# Patient Record
Sex: FEMALE | Race: WHITE
[De-identification: names, ages, dates, MRNs, and addresses within clinical notes are randomized per-mention and may not be internally consistent; named-entity substitution may affect disease eponyms.]

---

## 2017-06-05 ENCOUNTER — HOSPITAL ENCOUNTER (EMERGENCY)
Dept: HOSPITAL 68 - ERH | Age: 52
End: 2017-06-05
Payer: COMMERCIAL

## 2017-06-05 VITALS — DIASTOLIC BLOOD PRESSURE: 97 MMHG | SYSTOLIC BLOOD PRESSURE: 160 MMHG

## 2017-06-05 VITALS — WEIGHT: 167 LBS | BODY MASS INDEX: 29.59 KG/M2 | HEIGHT: 63 IN

## 2017-06-05 DIAGNOSIS — Z72.0: ICD-10-CM

## 2017-06-05 DIAGNOSIS — J40: Primary | ICD-10-CM

## 2017-06-05 NOTE — ED GENERAL ADULT
History of Present Illness
 
General
Chief Complaint: Chest Pain
Stated Complaint: "NOT FEELING WELL FOR A MONTH", CP
Source: patient
Exam Limitations: no limitations
 
Vital Signs & Intake/Output
Vital Signs & Intake/Output
 Vital Signs
 
 
Date Time Temp Pulse Resp B/P B/P Pulse O2 O2 Flow FiO2
 
     Mean Ox Delivery Rate 
 
06/05 1943      97   
 
06/05 1943      96 Room Air  
 
06/05 1819 100.0        
 
06/05 1813 100.0 101 20 160/97  98 Room Air  
 
 
 ED Intake and Output
 
 
 06/06 0000 06/05 1200
 
Intake Total  
 
Output Total  
 
Balance  
 
   
 
Patient 167 lb 
 
Weight  
 
Weight Reported by Patient 
 
Measurement  
 
Method  
 
 
Allergies
Coded Allergies:
NO KNOWN ALLERGIES (06/05/17)
 
Triage Note:
50 YO FEMALE TO ER C/O SINUS PRESSURE, SOB, FEVERS
AND DRY COUGH. STATES SAW HER DR AND WAS PUT ON
AMOXICILLIAN 10 DAYS AGO BUT DOESNT FEEL ANY
BETTER. C/O L SIDED CHEST PAIN.
PT TAKEN TO [facility] FOR EKG. TEMP 100.0 AT THIS
TIME. RA SATS 98
Triage Nurses Notes Reviewed? yes
HPI:
51-year-old female with a history of asthma, hypertension, cigarette smoking 
presenting with fevers with tmax to 100.4 Fahrenheit, left-sided chest tightness
, cough productive of yellow/green sputum, shortness of breath 1 month.  Also 
endorses sinus pressure with thick green rhinorrhea.  Was seen by PMD and placed
on 10 day course of amoxicillin which she has since finished with no 
improvement.  Currently smokes half pack of cigarettes per day.  Uses Symbicort 
daily for her asthma, has albuterol at home but has not tried it.
(VIRY VILLA,EVETTE)
Reconcile Medications
Albuterol Sulfate (Proair Hfa) 90 MCG HFA.AER.AD   2 PUF INH PRN RESPIRATORY  (
Reported)
Budesonide/Formoterol Fumarate (Symbicort 160-4.5 Mcg Inhaler) 160 MCG-4.5 MCG/
ACTUATION HFA.AER.AD   2 PUF INH BID RESPIRATORY  (Reported)
Buspirone HCl 30 MG TABLET   1 TAB PO QPM MENTAL HEALTH  (Reported)
Buspirone HCl 10 MG TABLET   1-2 TAB PO DAILY MENTAL HEALTH  (Reported)
Conjugated Estrogen Medroxypr (Prempro 0.45-1.5 MG Tablet) (Unknown Strength) 
TABLET   (Unknown Dose) UNKNOWN  (Reported)
Escitalopram Oxalate (Unknown Strength) TABLET   (Unknown Dose) UNKNOWN  (
Reported)
Lisinopril 40 MG TABLET   1 TAB PO DAILY BP  (Reported)
Moxifloxacin HCl (Avelox) 400 MG TABLET   1 TAB PO DAILY bronchitis
Prednisone 10 MG TABLET   6 TAB PO DAILY sob
Quetiapine Fumarate (Seroquel XR) 150 MG TAB.ER.24H   1 TAB PO QPM SLEEP  (
Reported)
 
(VERN GOULD,SAIDA CRUZ)
 
Past History
 
Travel History
Traveled to [place] past 21 day No
 
Medical History
Any Pertinent Medical History? see below for history
Neurological: NONE
EENT: NONE
Cardiovascular: hypertension
Respiratory: asthma
Gastrointestinal: NONE
Hepatic: NONE
Renal: NONE
Musculoskeletal: NONE
Psychiatric: NONE
Endocrine: NONE
Blood Disorders: NONE
Cancer(s): NONE
GYN/Reproductive: NONE
 
Surgical History
Surgical History: non-contributory
 
Psychosocial History
What is your primary language English
Tobacco Use: Current Daily Use
Daily Tobacco Use Amount/Type: => 5 Cigarettes daily
 
Family History
Hx Contributory? No
(EVETTE BAIRES PA-C)
 
Review of Systems
 
Review of Systems
Constitutional:
Reports: chills, fever, malaise, weakness.  Denies: diaphoresis. 
Respiratory:
Reports: cough, short of breath, sputum production, wheezing.  Denies: 
hemoptysis, orthopnea. 
Cardiovascular:
Reports: chest pain.  Denies: edema, palpitations, syncope. 
GI:
Reports: no symptoms. 
Genitourinary:
Reports: no symptoms. 
Musculoskeletal:
Reports: no symptoms. 
Skin:
Reports: no symptoms. 
Neurological/Psychological:
Reports: no symptoms. 
(EVETTE BAIRES PA-C)
 
Physical Exam
 
Physical Exam
General Appearance: well developed/nourished, no apparent distress, comfortable
Head: atraumatic
Respiratory: chest non-tender, no respiratory distress, on lung exam there are 
end expirtory wheezes bilaterally throughout all lung fields, no rhonci or rales
Cardiovascular: regular rate/rhythm, normal peripheral pulses
Skin: intact, normal color, warm/dry
 
Core Measures
ACS in differential dx? No
CVA/TIA Diagnosis: No
Severe Sepsis Present: No
Septic Shock Present: No
(EVETTE BAIRES PA-C)
 
Progress
Differential Diagnoses
I considered the following diagnoses in my evaluation of the patient: [Asthma 
exacerbation versus COPD exacerbation versus viral bronchitis versus pneumonia 
versus pleural effusion versus pleurisy]
 
Plan of Care:
 Orders
 
 
Procedure Date/time Status
 
EKG 06/05 1808 Active
 
 
Chest x-ray unremarkable.  Patient with significant improvement in chest 
tightness and shortness of breath after albuterol nebs and prednisone.  Given 
that patient continues to bring up thick green sputum and is a smoker Will cover
with a quinolone.  Patient given Rx for Moxi and prednisone.
(EVETTE BAIRES PA-C)
Initial ED EKG: normal axis, rhythm (sinus), rate (101), no ST T wave changes, 
One PVC noted in avf
(EVETTE BAIRES PA-C)
 
Departure
 
Departure
Disposition: HOME OR SELF CARE
Condition: Stable
Clinical Impression
Primary Impression: Bronchitis
Referrals:
LUPIS GOULD,JANA EUCEDA (PCP/Family)
 
Additional Instructions:
Take 400 mg of moxifloxacin by mouth once daily for 5 days.  Take 60 mg 
prednisone by mouth once daily for 5 days.  Use your albuterol inhaler every 4-6
hours as needed for shortness of breath and chest tightness.  Follow-up as 
scheduled with her primary care provider tomorrow.  Return to the ED for any new
or worsening symptoms.  Try to eliminate cigarette smoking as much as possible.
Departure Forms:
Customer Survey
General Discharge Information
(EVETTE BAIRES PA-C)
 
Departure
Prescriptions:
Current Visit Scripts
Prednisone 6 TAB PO DAILY  
     5 Days 
 
Moxifloxacin HCl (Avelox) 1 TAB PO DAILY  
     5 Days 
 
 
 
PA/NP Co-Sign Statement
Statement:
ED Attending supervision documentation-
 
[] I saw and evaluated the patient. I have also reviewed all the pertinent lab 
results and diagnostic results. I agree with the findings and the plan of care 
as documented in the PA's/NP's documentation. 
 
[X] I have reviewed the ED Record and agree with the PA's/NP's documentation.
 
[] Additions or exceptions (if any) to the PAs/NP's note and plan are 
summarized below:
[]
 
(VERN GOULD,SAIDA CRUZ)
 
Critical Care Note
 
Critical Care Note
Critical Care Time: non-applicable
(EVETTE BAIRES PA-C)

## 2017-06-05 NOTE — RADIOLOGY REPORT
EXAMINATION:
XR CHEST
 
CLINICAL INFORMATION:
Cough, fever, pneumonia
 
COMPARISON:
10/29/2015
 
TECHNIQUE:
2 views of the chest were obtained.
 
FINDINGS:
Lungs are clear. No focal consolidation or mass. Normal pulmonary
vascularity.  No pleural effusion or pneumothorax. Normal heart size.
Regional skeleton intact.
 
IMPRESSION:
No acute pulmonary disease. No focal consolidation to suggest pneumonia.

## 2018-01-08 ENCOUNTER — HOSPITAL ENCOUNTER (EMERGENCY)
Dept: HOSPITAL 68 - ERH | Age: 53
Discharge: STILL ADMITTED OR ADMINISTRATIVE | End: 2018-01-08
Payer: COMMERCIAL

## 2018-01-08 DIAGNOSIS — R68.89: Primary | ICD-10-CM

## 2018-09-16 ENCOUNTER — HOSPITAL ENCOUNTER (EMERGENCY)
Dept: HOSPITAL 68 - ERH | Age: 53
LOS: 1 days | End: 2018-09-17
Payer: COMMERCIAL

## 2018-09-16 VITALS — BODY MASS INDEX: 31.89 KG/M2 | WEIGHT: 180 LBS | HEIGHT: 63 IN

## 2018-09-16 DIAGNOSIS — F10.129: ICD-10-CM

## 2018-09-16 DIAGNOSIS — F17.210: ICD-10-CM

## 2018-09-16 DIAGNOSIS — R45.851: Primary | ICD-10-CM

## 2018-09-16 DIAGNOSIS — J45.909: ICD-10-CM

## 2018-09-16 DIAGNOSIS — I10: ICD-10-CM

## 2018-09-16 LAB
ABSOLUTE GRANULOCYTE CT: 6.2 /CUMM (ref 1.4–6.5)
BASOPHILS # BLD: 0 /CUMM (ref 0–0.2)
BASOPHILS NFR BLD: 0.3 % (ref 0–2)
EOSINOPHIL # BLD: 0.1 /CUMM (ref 0–0.7)
EOSINOPHIL NFR BLD: 1 % (ref 0–5)
ERYTHROCYTE [DISTWIDTH] IN BLOOD BY AUTOMATED COUNT: 14.8 % (ref 11.5–14.5)
GRANULOCYTES NFR BLD: 70 % (ref 42.2–75.2)
HCT VFR BLD CALC: 41.5 % (ref 37–47)
LYMPHOCYTES # BLD: 1.8 /CUMM (ref 1.2–3.4)
MCH RBC QN AUTO: 32.2 PG (ref 27–31)
MCHC RBC AUTO-ENTMCNC: 33.6 G/DL (ref 33–37)
MCV RBC AUTO: 95.7 FL (ref 81–99)
MONOCYTES # BLD: 0.7 /CUMM (ref 0.1–0.6)
PLATELET # BLD: 298 /CUMM (ref 130–400)
PMV BLD AUTO: 7.4 FL (ref 7.4–10.4)
RED BLOOD CELL CT: 4.34 /CUMM (ref 4.2–5.4)
WBC # BLD AUTO: 8.9 /CUMM (ref 4.8–10.8)

## 2018-09-16 PROCEDURE — G0480 DRUG TEST DEF 1-7 CLASSES: HCPCS

## 2018-09-16 PROCEDURE — G0463 HOSPITAL OUTPT CLINIC VISIT: HCPCS

## 2018-09-17 VITALS — SYSTOLIC BLOOD PRESSURE: 147 MMHG | DIASTOLIC BLOOD PRESSURE: 72 MMHG
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
## 2018-09-17 NOTE — RADIOLOGY REPORT
EXAMINATION:
XR CHEST
 
CLINICAL INFORMATION:
Cough, low-grade fever
 
COMPARISON:
04/27/2018
 
TECHNIQUE:
2 views of the chest were obtained.
 
FINDINGS:
The lungs are clear with no focal consolidation. No evidence of pneumothorax,
pulmonary edema, or pleural effusions.
 
The cardiomediastinal silhouette is unremarkable. No acute osseous findings.
 
IMPRESSION:
No acute cardiopulmonary findings.

## 2018-09-19 ENCOUNTER — HOSPITAL ENCOUNTER (INPATIENT)
Dept: HOSPITAL 68 - ERH | Age: 53
LOS: 6 days | End: 2018-09-25
Attending: INTERNAL MEDICINE | Admitting: INTERNAL MEDICINE
Payer: COMMERCIAL

## 2018-09-19 VITALS — DIASTOLIC BLOOD PRESSURE: 100 MMHG | SYSTOLIC BLOOD PRESSURE: 135 MMHG

## 2018-09-19 VITALS — DIASTOLIC BLOOD PRESSURE: 101 MMHG | SYSTOLIC BLOOD PRESSURE: 193 MMHG

## 2018-09-19 VITALS — BODY MASS INDEX: 30.69 KG/M2 | HEIGHT: 63 IN | WEIGHT: 173.19 LBS

## 2018-09-19 VITALS — SYSTOLIC BLOOD PRESSURE: 169 MMHG | DIASTOLIC BLOOD PRESSURE: 91 MMHG

## 2018-09-19 VITALS — SYSTOLIC BLOOD PRESSURE: 140 MMHG | DIASTOLIC BLOOD PRESSURE: 98 MMHG

## 2018-09-19 VITALS — SYSTOLIC BLOOD PRESSURE: 183 MMHG | DIASTOLIC BLOOD PRESSURE: 107 MMHG

## 2018-09-19 VITALS — DIASTOLIC BLOOD PRESSURE: 109 MMHG | SYSTOLIC BLOOD PRESSURE: 154 MMHG

## 2018-09-19 DIAGNOSIS — E87.6: ICD-10-CM

## 2018-09-19 DIAGNOSIS — Z79.51: ICD-10-CM

## 2018-09-19 DIAGNOSIS — R00.0: ICD-10-CM

## 2018-09-19 DIAGNOSIS — R11.2: ICD-10-CM

## 2018-09-19 DIAGNOSIS — F10.239: Primary | ICD-10-CM

## 2018-09-19 DIAGNOSIS — F32.9: ICD-10-CM

## 2018-09-19 DIAGNOSIS — R74.0: ICD-10-CM

## 2018-09-19 DIAGNOSIS — F41.9: ICD-10-CM

## 2018-09-19 DIAGNOSIS — K70.9: ICD-10-CM

## 2018-09-19 DIAGNOSIS — J45.909: ICD-10-CM

## 2018-09-19 DIAGNOSIS — K76.0: ICD-10-CM

## 2018-09-19 DIAGNOSIS — E78.5: ICD-10-CM

## 2018-09-19 DIAGNOSIS — I10: ICD-10-CM

## 2018-09-19 LAB
ABSOLUTE GRANULOCYTE CT: 9.9 /CUMM (ref 1.4–6.5)
BASOPHILS # BLD: 0 /CUMM (ref 0–0.2)
BASOPHILS NFR BLD: 0.1 % (ref 0–2)
EOSINOPHIL # BLD: 0 /CUMM (ref 0–0.7)
EOSINOPHIL NFR BLD: 0 % (ref 0–5)
ERYTHROCYTE [DISTWIDTH] IN BLOOD BY AUTOMATED COUNT: 15.2 % (ref 11.5–14.5)
GRANULOCYTES NFR BLD: 87.1 % (ref 42.2–75.2)
HCT VFR BLD CALC: 40.2 % (ref 37–47)
LYMPHOCYTES # BLD: 0.8 /CUMM (ref 1.2–3.4)
MCH RBC QN AUTO: 32.5 PG (ref 27–31)
MCHC RBC AUTO-ENTMCNC: 34.2 G/DL (ref 33–37)
MCV RBC AUTO: 94.9 FL (ref 81–99)
MONOCYTES # BLD: 0.6 /CUMM (ref 0.1–0.6)
PLATELET # BLD: 261 /CUMM (ref 130–400)
PMV BLD AUTO: 7.8 FL (ref 7.4–10.4)
RED BLOOD CELL CT: 4.24 /CUMM (ref 4.2–5.4)
WBC # BLD AUTO: 11.4 /CUMM (ref 4.8–10.8)

## 2018-09-19 PROCEDURE — 2NBSP: CPT

## 2018-09-19 PROCEDURE — G0480 DRUG TEST DEF 1-7 CLASSES: HCPCS

## 2018-09-19 NOTE — ED GENERAL ADULT
History of Present Illness
 
General
Chief Complaint: ETOH/Drug Related Complaint
Stated Complaint: PT STATES DETOXING FROM ETOH
Source: patient
Exam Limitations: no limitations
 
Vital Signs & Intake/Output
Vital Signs & Intake/Output
 Vital Signs
 
 
Date Time Temp Pulse Resp B/P B/P Pulse O2 O2 Flow FiO2
 
     Mean Ox Delivery Rate 
 
09/19 1400 98.2 111 18 169/91     
 
09/19 1308 98.2 105 18 193/101  98   
 
09/19 1307 98.2 105 18 193/101     
 
09/19 1202 98.0 115 20 135/100     
 
09/19 1122 98.0 115 20 135/100  96 Room Air  
 
09/19 0925 98.2 131 20 161/100  99 Room Air  
 
 
 
Allergies
Coded Allergies:
NO KNOWN ALLERGIES (06/05/17)
 
Reconcile Medications
Albuterol Sulfate (Proair Hfa) 90 MCG HFA.AER.AD   2 PUF INH PRN RESPIRATORY  (
Reported)
Budesonide/Formoterol Fumarate (Symbicort 160-4.5 Mcg Inhaler) 160 MCG-4.5 MCG/
ACTUATION HFA.AER.AD   2 PUF INH BID RESPIRATORY  (Reported)
Buspirone HCl 30 MG TABLET   1 TAB PO QPM MENTAL HEALTH  (Reported)
Buspirone HCl 10 MG TABLET   1 TAB PO DAILY MENTAL HEALTH  (Reported)
Doxepin HCl 100 MG CAPSULE   2 CAP PO DAILY UNKNOWN  (Reported)
Escitalopram Oxalate 10 MG TABLET   1 TAB PO DAILY MENTAL HEALTH  (Reported)
Lisinopril 40 MG TABLET   1 TAB PO DAILY BP  (Reported)
Nifedipine (Nifedipine ER) 30 MG TABLET.ER   1 TAB PO DAILY HEART  (Reported)
Pravastatin Sodium 40 MG TABLET   1 TAB PO DAILY CHOLESTEROL  (Reported)
Quetiapine Fumarate (Seroquel XR) 150 MG TAB.ER.24H   1 TAB PO QPM SLEEP  (
Reported)
Valsartan (Diovan) 160 MG TABLET   1 TAB PO DAILY HEART  (Reported)
 
Triage Note:
BIBA FROM HOME,  C/O FEVER, [place] AND VOMITING
PALPITATIONS SINCE 1830 YESTERDAY.  STATES SHE IS
TRYING TO DETOX OFF ALCOHOL.  SEEN HERE ON 9/16
FOR INTOCXICATION.
Triage Nurses Notes Reviewed? yes
HPI:
52-year-old female with past medical history significant for alcohol dependence 
presents with nausea and vomiting since last night and shakes.  Patient states 
that she is going through alcohol detox.  She presents today requesting for help
with her withdrawals and wants help with alcohol detox.  Associated symptoms of 
shakiness, emotional lability, feeling jittery.  Drinks 3 pints of Southern 
comfort a day.  She states that she has tried to wean herself of the alcohol but
she is unable to and she is afraid if she stops cold turkey on her own that she 
will have seizures and possibly worse.
 
Past History
 
Travel History
Traveled to [place] past 21 day No
 
Medical History
Any Pertinent Medical History? see below for history
Neurological: NONE
EENT: NONE
Cardiovascular: hypertension
Respiratory: asthma
Gastrointestinal: NONE
Hepatic: NONE
Renal: NONE
Musculoskeletal: NONE
Psychiatric: NONE
Endocrine: NONE
Blood Disorders: NONE
Cancer(s): NONE
GYN/Reproductive: NONE
 
Surgical History
Surgical History: non-contributory
 
Psychosocial History
Who do you live with Significant Other
What is your primary language English
Tobacco Use: Current Daily Use
Daily Tobacco Use Amount/Type: => 5 Cigarettes daily
ETOH Use: heavy use
 
Family History
Hx Contributory? Yes
 
Review of Systems
 
Review of Systems
Constitutional:
Reports: no symptoms, see HPI. 
EENTM:
Reports: no symptoms. 
Respiratory:
Reports: no symptoms. 
Cardiovascular:
Reports: no symptoms. 
GI:
Reports: no symptoms. 
Genitourinary:
Reports: no symptoms. 
Musculoskeletal:
Reports: no symptoms. 
Skin:
Reports: no symptoms. 
Neurological/Psychological:
Reports: no symptoms. 
Hematologic/Endocrine:
Reports: no symptoms. 
Immunologic/Allergic:
Reports: no symptoms. 
All Other Systems: Reviewed and Negative
 
Physical Exam
 
Physical Exam
General Appearance: no apparent distress, anxious
Comments:
General: Alert, anxious, cooperative
Head: Normocephalic, atraumatic
Eyes: Normal inspection, no nystagmus, EOMI
Ears: Normal inspection
Nose: Normal inspection
Throat: Moist mucosa
Neck: Supple, no goiter
Heart: Regular rate and rhythm, no murmurs rubs or gallops
Lungs: Clear to auscultation bilaterally with good air entry
Abdomen: Soft nontender nondistended, normal bowel sounds
Chest: Nontender
Extremities: Normal range of motion grossly, mild tremors present, no cyanosis 
clubbing or edema of the upper extremities
Neurologic: cranial nerves II through XII grossly intact, speech clear, gait 
normal
Psychiatric: No apparent delusions or hallucinations, no pressured speech or 
thought blocking
 
Core Measures
ACS in differential dx? No
CVA/TIA Diagnosis: No
Sepsis Present: No
Sepsis Focused Exam Completed? No
 
Progress
Differential Diagnoses
I considered the following diagnoses in my evaluation of the patient: 
 
Plan of Care:
 Orders
 
 
Procedure Date/time Status
 
EKG 09/19 0929 Active
 
CIWA 09/19 0927 Active
 
URINE DRUGS OF ABUSE 09/19 0927 Complete
 
ETHANOL 09/19 0927 Complete
 
COMPREHENSIVE METABOLIC PANEL 09/19 0927 Complete
 
CBC WITHOUT DIFFERENTIAL 09/19 0927 Complete
 
 
 Current Medications
 
 
  Sig/Rafa Start time  Last
 
Medication Dose  Stop Time Status Admin
 
Cyanocobalamin/ 1 BAG ONCE ONE 09/19 1230 AC 09/19
 
Thiamine/Pyridoxine   09/19 2029  1256
 
(Vitamin in I.V.)     
 
Dextrose/Water 1,000 ML    
 
(D5W 1000)     
 
 
 Laboratory Tests
 
 
 
09/19/18 1224:
Urine Opiates Screen 134, Methadone Screen 74, Barbiturate Screen < 60, Ur 
Phencyclidine Scrn < 6.00, Amphetamines Screen < 100, U Benzodiazepines Scrn < 
85, Urine Cocaine Screen < 50, Urine Cannabis Screen 32.70
 
09/19/18 0946:
Anion Gap 11, Estimated GFR > 60, BUN/Creatinine Ratio 14.0, Glucose 142  H, 
Calcium 9.1, Total Bilirubin 0.8, AST 52  H, ALT 65  H, Alkaline Phosphatase 124
, Total Protein 7.0, Albumin 4.5, Globulin 2.5, Albumin/Globulin Ratio 1.8, CBC 
w Diff MAN DIFF ORDERED, RBC 4.24, MCV 94.9, MCH 32.5  H, MCHC 34.2, RDW 15.2  H
, MPV 7.8, Gran % 87.1  H, Lymphocytes % 7.1  L, Monocytes % 5.7, Eosinophils % 
0, Basophils % 0.1, Absolute Granulocytes 9.9  H, Segmented Neutrophils 81  H, 
Band Neutrophils 2, Absolute Lymphocytes 0.8  L, Lymphocytes 10  L, Monocytes 7,
Absolute Monocytes 0.6, Absolute Eosinophils 0, Absolute Basophils 0, Platelet 
Estimate ADEQUATE, Normocytic RBCs VERIFIED, Normochromic RBCs VERIFIED, Serum 
Alcohol < 10.0
 
Initial ED EKG: normal axis, normal intervals, normal p-waves, normal QRS 
complex, normal sinus rhythm, nonspecific ST T wave chg
 
Departure
 
Departure
Disposition: STILL A PATIENT
Condition: Stable
Clinical Impression
Primary Impression: Alcohol abuse
Secondary Impressions: 
Alcohol withdrawal
Qualifiers:  Complication of substance-induced condition: with unspecified 
complication Qualified Code: F10.239 - Alcohol dependence with withdrawal, 
unspecified
 
Referrals:
Mahesh Gonzalez MD (PCP/Family)
 
Departure Forms:
Customer Survey
General Discharge Information
Comments
Initial CIWA of 17, repeat of 5 s/p 2 mg of ativan. Ms Ferreira continues to 
have symptoms. 
 
Critical Care Note
 
Critical Care Note
Critical Care Time: non-applicable

## 2018-09-19 NOTE — HISTORY & PHYSICAL
Jose GOULD,[facility] 09/19/18 3184:
General Information and HPI
MD Statement:
I have seen and personally examined SAJAN CROSS and documented this H&P.
 
The patient is a 52 year old F who presented with a patient stated chief 
complaint of [nausea vomiting].
 
Source of Information: patient
Exam Limitations: no limitations
History of Present Illness:
The patient is 52-year-old female with past medical history significant for 
depression, asthma, hypertension and hyperlipidemia.  He presented to [facility]
with a complaint of subjective fever and nausea vomiting.  Patient reported that
she usually drinks 5 pints of hard liquor every day ongoing for past 1 year..  
Her last drink was yesterday morning around 8 AM.  Since then patient has been 
feeling nauseous and has been having multiple episodes of retching and a couple 
of episodes of vomiting, to a point that she cannot keep anything down.  Patient
denies any hematemesis.  Patient has been previously treated in a detox facility
in [place] denies any seizures alcohol withdrawal any ICU admission in the past.
The patient also reports generalized abdominal pain mostly concentrated in 
epigastric region.  Worsened by retching.  Dull in character nonradiating.
Review of systems she denies any chest pain reports mild cough which she is 
attributing to retching.
 
Allergies/Medications
Allergies:
Coded Allergies:
NO KNOWN ALLERGIES (06/05/17)
 
Home Med list
Albuterol Sulfate (Proair Hfa) 90 MCG HFA.AER.AD   2 PUF INH PRN RESPIRATORY  (
Reported)
Budesonide/Formoterol Fumarate (Symbicort 160-4.5 Mcg Inhaler) 160 MCG-4.5 MCG/
ACTUATION HFA.AER.AD   2 PUF INH BID RESPIRATORY  (Reported)
Buspirone HCl 30 MG TABLET   1 TAB PO QPM MENTAL HEALTH  (Reported)
Buspirone HCl 10 MG TABLET   1 TAB PO DAILY MENTAL HEALTH  (Reported)
Doxepin HCl 100 MG CAPSULE   2 CAP PO DAILY Mental Health  (Reported)
Escitalopram Oxalate 10 MG TABLET   1 TAB PO DAILY MENTAL HEALTH  (Reported)
Multiple Vitamin (Multivitamins) 1 EACH TABLET   1 TAB PO DAILY supplement
Nifedipine (Nifedipine ER) 30 MG TABLET.ER   1 TAB PO DAILY HEART  (Reported)
Omeprazole 40 MG CAPSULE.DR   1 CAP PO DAILY gastritis
Pravastatin Sodium 40 MG TABLET   1 TAB PO DAILY CHOLESTEROL  (Reported)
Valsartan (Diovan) 160 MG TABLET   1 TAB PO DAILY HEART  (Reported)
 
 
Past History
 
Travel History
Traveled to [place] past 21 day No
 
Medical History
Neurological: NONE
EENT: NONE
Cardiovascular: hypertension
Respiratory: asthma
Gastrointestinal: NONE
Hepatic: NONE
Renal: NONE
Musculoskeletal: NONE
Psychiatric: NONE
Endocrine: NONE
Blood Disorders: NONE
Cancer(s): NONE
GYN/Reproductive: NONE
 
Surgical History
Surgical History: non-contributory
 
Past Family/Social History
 
Family History
Relations & Conditions if any
Relation not specified for:
  *No pertinent family history
 
 
Psychosocial History
Where do you live? Home
Who Do You Live With? spouse
Smoking Status: Current Everyday Smoker
ETOH Use: heavy use
 
Functional Ability
ADLs
Independent: dressing, eating, toileting, bathing. 
Ambulation: independent
IADLs
Independent: shopping, housework, finances, food prep, telephone, transportation
, medication admin. 
 
Review of Systems
 
Review of Systems
Constitutional:
Reports: see HPI. 
 
Exam & Diagnostic Data
Last 24 Hrs of Vital Signs/I&O
 Vital Signs
 
 
Date Time Temp Pulse Resp B/P B/P Pulse O2 O2 Flow FiO2
 
     Mean Ox Delivery Rate 
 
09/19 1600 98.3 118 18 154/109     
 
09/19 1600 98.3 118 18 154/109  95   
 
09/19 1400 98.2 111 18 169/91     
 
09/19 1308 98.2 105 18 193/101  98   
 
09/19 1307 98.2 105 18 193/101     
 
09/19 1202 98.0 115 20 135/100     
 
09/19 1122 98.0 115 20 135/100  96 Room Air  
 
09/19 0925 98.2 131 20 161/100  99 Room Air  
 
 
 Intake & Output
 
 
 09/19 1600 09/19 0800 09/19 0000
 
Intake Total 1000  
 
Output Total   
 
Balance 1000  
 
    
 
Intake, IV 1000  
 
Patient 170 lb  
 
Weight   
 
Weight Reported by Patient  
 
Measurement   
 
Method   
 
 
 
 
Physical Exam
General Appearance Alert, Oriented X3, Cooperative
HEENT Atraumatic
Neck Supple
Cardiovascular Normal S1, Normal S2
Lungs Clear to Auscultation, Normal Air Movement
Abdomen Normal Bowel Sounds, Soft, epigastric mild tendrness
Neurological Normal Gait, Normal Speech
Extremities No Cyanosis, No Edema
Last 24 Hrs of Labs/Leroy:
 Laboratory Tests
 
09/19/18 1224:
Urine Opiates Screen 134, Methadone Screen 74, Barbiturate Screen < 60, Ur 
Phencyclidine Scrn < 6.00, Amphetamines Screen < 100, U Benzodiazepines Scrn < 
85, Urine Cocaine Screen < 50, Urine Cannabis Screen 32.70
 
09/19/18 0946:
Anion Gap 11, Estimated GFR > 60, BUN/Creatinine Ratio 14.0, Glucose 142  H, 
Calcium 9.1, Total Bilirubin 0.8, AST 52  H, ALT 65  H, Alkaline Phosphatase 124
, Total Protein 7.0, Albumin 4.5, Globulin 2.5, Albumin/Globulin Ratio 1.8, CBC 
w Diff MAN DIFF ORDERED, RBC 4.24, MCV 94.9, MCH 32.5  H, MCHC 34.2, RDW 15.2  H
, MPV 7.8, Gran % 87.1  H, Lymphocytes % 7.1  L, Monocytes % 5.7, Eosinophils % 
0, Basophils % 0.1, Absolute Granulocytes 9.9  H, Segmented Neutrophils 81  H, 
Band Neutrophils 2, Absolute Lymphocytes 0.8  L, Lymphocytes 10  L, Monocytes 7,
Absolute Monocytes 0.6, Absolute Eosinophils 0, Absolute Basophils 0, Platelet 
Estimate ADEQUATE, Normocytic RBCs VERIFIED, Normochromic RBCs VERIFIED, Serum 
Alcohol < 10.0
 
 
Assessment/Plan
Assessment:
The patient is 52-year-old female with above-mentioned past medical history.  
She came in evaluation of nausea vomiting and fever.  Her past medical history 
significant for alcohol abuse disorder and drinks about 4-5 points every day.  
On presentation her vitals were significant for tachycardia and blood pressure 
of 169/91.
Labs are significant for mild leukocytosis of 11.4 hypokalemia 3.1 transaminitis
AST 52 ALT 65
The patient is being admitted to general medicine floor for treatment and 
evaluation of following conditions
 
#Alcohol withdrawal
Significant history of alcohol abuse with hard liquor last drink yesterday a.m.
-[facility] protocol 
-Ativan 2 mg Q6 
-symptomatic management of nausea with Zofran/Tigan
-Multivitamin folate and thiamine
 
#Transaminitis
In setting of alcohol abuse medically discrimination shows good prognosis
Continue to monitor
If trending upward we will consider right upper quadrant ultrasound
 
#Hypokalemia
 in setting of alcohol abuse and poor oral intake and ongoing nausea and 
vomiting
-Continue to monitor and replete aggressively
 
#Abdominal pain/N/V
Appears to be secondary to retching versus alcoholic gastritis.  
-We will start patient on Protonix
We will check lipase to rule out pancreatitis
-IV hydration with ongoing nausea vomiting
 
Full code/DVT prophylaxis with Lovenox/clears for now and advance as tolerated
 
As Ranked By This Provider
Problem List:
 1. Alcohol withdrawal
   Qualifiers
 Complication of substance-induced condition: with unspecified complication 
Qualified Code: F10.239 - Alcohol dependence with withdrawal, unspecified
 
 
Core Measures/Misc (9/17)
 
Acute Coronary Syndrome
ACS Diagnosis: No
 
Congestive Heart Failure
Congestive Heart Failure Diagnosis No
 
Cerebrovascular Accident
CVA/TIA Diagnosis: No
 
VTE (View Protocol)
VTE Risk Factors Age>40
No Mechanical VTE Prophylaxis d/t N/A MechProphylax Ordered
No VTE Pharm Prophylaxis d/t NA PharmProphylax ordered
 
Sepsis (View protocol)
Sepsis Present: No
If YES complete Sepsis Event Note If YES complete Sepsis Event Note
 
 
Shelly Servin MD 09/19/18 1757:
Core Measures/Misc (9/17)
 
Sepsis (View protocol)
If YES complete Sepsis Event Note If YES complete Sepsis Event Note
 
Attending MD Review Statement
 
Attending Statement
Attending MD Statement: examined this patient, discuss w/resident/PA/NP, agreed 
w/resident/PA/NP, reviewed EMR data (avail), discussed with nursing, amended to 
note
Attending Assessment/Plan:
52-year-old female with past medical history significant for alcohol use, asthma
,Hypertension, depression who is resenting to get alcohol detox patient has been
drinking hard liquor.  Almost 4-5 pints every day.  She has been doing this for 
almost a year now.  She states that she had been to a facility in [place] in the
past.  She came back and got depressed and started to drink.  She is also 
complaining of generalized abdominal pain mostly in the epigastric region.  She 
was vomiting all night.  Denies any bright red blood per rectum.  Denies any 
hematemesis.  She also had some diarrhea.  She is here because she cannot take 
it anymore.  In the emergency room patient received Ativan.
 
Vital Signs
 
 
Date Time Temp Pulse Resp B/P B/P Pulse O2 O2 Flow FiO2
 
     Mean Ox Delivery Rate 
 
09/19 1600 98.3 118 18 154/109     
 
09/19 1600 98.3 118 18 154/109  95   
 
09/19 1400 98.2 111 18 169/91     
 
09/19 1308 98.2 105 18 193/101  98   
 
09/19 1307 98.2 105 18 193/101     
 
09/19 1202 98.0 115 20 135/100     
 
09/19 1122 98.0 115 20 135/100  96 Room Air  
 
09/19 0925 98.2 131 20 161/100  99 Room Air  
 
 
on exam; 
aox3, mild distress 2/2 to withdrawl.
heent: dry MM. 
Cv; s1,s2, rrr, tachy
resp; clear
abd; soft, tender in epigastrium, bs+
ext; no edema
 
 Laboratory Tests
 
 
 09/19 09/19
 
 1224 0946
 
Chemistry  
 
  Sodium (137 - 145 mmol/L)  139
 
  Potassium (3.5 - 5.1 mmol/L)  3.1  L
 
  Chloride (98 - 107 mmol/L)  100
 
  Carbon Dioxide (22 - 30 mmol/L)  28
 
  Anion Gap (5 - 16)  11
 
  BUN (7 - 17 mg/dL)  7
 
  Creatinine (0.5 - 1.0 mg/dL)  0.5
 
  Estimated GFR (>60 ml/min)  > 60
 
  BUN/Creatinine Ratio (7 - 25 %)  14.0
 
  Glucose (65 - 99 mg/dL)  142  H
 
  Calcium (8.4 - 10.2 mg/dL)  9.1
 
  Total Bilirubin (0.2 - 1.3 mg/dL)  0.8
 
  AST (14 - 36 U/L)  52  H
 
  ALT (9 - 52 U/L)  65  H
 
  Alkaline Phosphatase (<127 U/L)  124
 
  Total Protein (6.3 - 8.2 g/dL)  7.0
 
  Albumin (3.5 - 5.0 g/dL)  4.5
 
  Globulin (1.9 - 4.2 gm/dL)  2.5
 
  Albumin/Globulin Ratio (1.1 - 2.2 %)  1.8
 
Hematology  
 
  CBC w Diff  MAN DIFF ORDERED
 
  WBC (4.8 - 10.8 /CUMM)  11.4  H
 
  RBC (4.20 - 5.40 /CUMM)  4.24
 
  Hgb (12.0 - 16.0 G/DL)  13.8
 
  Hct (37 - 47 %)  40.2
 
  MCV (81.0 - 99.0 FL)  94.9
 
  MCH (27.0 - 31.0 PG)  32.5  H
 
  MCHC (33.0 - 37.0 G/DL)  34.2
 
  RDW (11.5 - 14.5 %)  15.2  H
 
  Plt Count (130 - 400 /CUMM)  261
 
  MPV (7.4 - 10.4 FL)  7.8
 
  Gran % (42.2 - 75.2 %)  87.1  H
 
  Lymphocytes % (20.5 - 51.1 %)  7.1  L
 
  Monocytes % (1.7 - 9.3 %)  5.7
 
  Eosinophils % (0 - 5 %)  0
 
  Basophils % (0.0 - 2.0 %)  0.1
 
  Absolute Granulocytes (1.4 - 6.5 /CUMM)  9.9  H
 
  Segmented Neutrophils (42.2 - 75.2 %)  81  H
 
  Band Neutrophils (0.0 - 5.0 %)  2
 
  Absolute Lymphocytes (1.2 - 3.4 /CUMM)  0.8  L
 
  Lymphocytes (20.5 - 51.1 %)  10  L
 
  Monocytes (1.7 - 9.3 %)  7
 
  Absolute Monocytes (0.10 - 0.60 /CUMM)  0.6
 
  Absolute Eosinophils (0.0 - 0.7 /CUMM)  0
 
  Absolute Basophils (0.0 - 0.2 /CUMM)  0
 
  Platelet Estimate (ADEQUATE)  ADEQUATE
 
  Normocytic RBCs  VERIFIED
 
  Normochromic RBCs  VERIFIED
 
Toxicology  
 
  Urine Opiates Screen (>2000 NG/ML) 134 
 
  Methadone Screen (>300 NG/ML) 74 
 
  Barbiturate Screen (>200 NG/ML) < 60 
 
  Ur Phencyclidine Scrn (>25 NG/ML) < 6.00 
 
  Amphetamines Screen (>1000 NG/ML) < 100 
 
  U Benzodiazepines Scrn (>200 NG/ML) < 85 
 
  Urine Cocaine Screen (>300 NG/ML) < 50 
 
  Urine Cannabis Screen (>50 NG/ML) 32.70 
 
  Serum Alcohol (<10 MG/DL)  < 10.0
 
 
A/P: 52-year-old female with past medical history significant for alcohol use, 
asthma,Hypertension, depression will be admitted to medicine floor with acute 
alcohol intoxication needing detox as well as abd pain.
 
Patient will be started on scheduled and as needed Ativan per [facility] protocol.  
She will be given multivitamin, folate and thiamine.  Please repeat all of her 
electrolytes as indicated.  Please confirm and continue home medications. 
Hydrate with IVfs as she looks Dehydrated.  Symptomatic control of nausea 
vomiting with antiemetics.  Her abdominal pain is likely secondary to alcoholic 
gastritis.  Please add amylase lipase to look for any evidence of acute 
pancreatitis. Please give IV PPI. 
She will be on pharmacologic DVT prophylaxis and she is a full code.
Please obtain social work consult.

## 2018-09-20 VITALS — SYSTOLIC BLOOD PRESSURE: 122 MMHG | DIASTOLIC BLOOD PRESSURE: 84 MMHG

## 2018-09-20 VITALS — SYSTOLIC BLOOD PRESSURE: 182 MMHG | DIASTOLIC BLOOD PRESSURE: 124 MMHG

## 2018-09-20 VITALS — DIASTOLIC BLOOD PRESSURE: 81 MMHG | SYSTOLIC BLOOD PRESSURE: 114 MMHG

## 2018-09-20 VITALS — DIASTOLIC BLOOD PRESSURE: 130 MMHG | SYSTOLIC BLOOD PRESSURE: 172 MMHG

## 2018-09-20 VITALS — DIASTOLIC BLOOD PRESSURE: 96 MMHG | SYSTOLIC BLOOD PRESSURE: 138 MMHG

## 2018-09-20 VITALS — DIASTOLIC BLOOD PRESSURE: 124 MMHG | SYSTOLIC BLOOD PRESSURE: 182 MMHG

## 2018-09-20 LAB
ABSOLUTE GRANULOCYTE CT: 5 /CUMM (ref 1.4–6.5)
BASOPHILS # BLD: 0 /CUMM (ref 0–0.2)
BASOPHILS NFR BLD: 0.3 % (ref 0–2)
EOSINOPHIL # BLD: 0 /CUMM (ref 0–0.7)
EOSINOPHIL NFR BLD: 0.6 % (ref 0–5)
ERYTHROCYTE [DISTWIDTH] IN BLOOD BY AUTOMATED COUNT: 15.5 % (ref 11.5–14.5)
GRANULOCYTES NFR BLD: 74.6 % (ref 42.2–75.2)
HCT VFR BLD CALC: 39.1 % (ref 37–47)
LYMPHOCYTES # BLD: 1.1 /CUMM (ref 1.2–3.4)
MCH RBC QN AUTO: 32.6 PG (ref 27–31)
MCHC RBC AUTO-ENTMCNC: 34.3 G/DL (ref 33–37)
MCV RBC AUTO: 95.1 FL (ref 81–99)
MONOCYTES # BLD: 0.5 /CUMM (ref 0.1–0.6)
PLATELET # BLD: 202 /CUMM (ref 130–400)
PMV BLD AUTO: 8.3 FL (ref 7.4–10.4)
RED BLOOD CELL CT: 4.11 /CUMM (ref 4.2–5.4)
WBC # BLD AUTO: 6.7 /CUMM (ref 4.8–10.8)

## 2018-09-20 NOTE — PN- HOUSESTAFF
Regino Arango 18 0938:
Subjective
Follow-up For:
alcohol withdrwal
Subjective:
Patient was seen and examined today. she said she feels better, but has some 
vague abdominal soreness, cold sweats, nausea, tremors, . Her CIWA has been 0  
and has recieved Ativan 2mg only,. 
She also c/o muscle soreness at the siteof anti emetic injection.and requested 
to change them
 
Review of Systems
Constitutional:
Reports: see HPI. 
 
Objective
Last 24 Hrs of Vital Signs/I&O
 Vital Signs
 
 
Date Time Temp Pulse Resp B/P B/P Pulse O2 O2 Flow FiO2
 
     Mean Ox Delivery Rate 
 
 1800 98.1 133 16 114/81     
 
 1741 99.1 130 20 114/81  96 Room Air  
 
 1620  119  182/124     
 
 1600 98.7 134 16 182/124     
 
 1417 99.0 130 20 172/130  95 Room Air  
 
 1233 98.9 119 18 182/124  98 Room Air  
 
 1224       Room Air Room Air 
 
 1200 97.5 119 16 182/124     
 
 1000 97.1 95 16 122/84     
 
 0659 98.4 110 20 138/96  96   
 
 2219 98.6 111 20 140/98  94 Room Air  
 
 2200 98.6 111 20 140/98     
 
 
 Intake & Output
 
 
  1600  0800  0000
 
Intake Total  420 365
 
Output Total   
 
Balance  420 365
 
    
 
Intake, IV  300 125
 
Intake, Oral  120 240
 
Patient   173 lb
 
Weight   
 
Weight   Bed scale
 
Measurement   
 
Method   
 
 
 
 
Physical Exam
General Appearance: Alert, Oriented X3, Cooperative, No Acute Distress
Cardiovascular: Regular Rate, No Murmurs
Lungs: Clear to Auscultation, Normal Air Movement
Abdomen: diffuse tenderness in epigastric area
Neurological: Normal Speech, Strength at 5/5 X4 Ext, Normal Tone, Sensation 
Intact
Current Medications:
 Current Medications
 
 
  Sig/Rafa Start time  Last
 
Medication Dose Route Stop Time Status Admin
 
Albuterol Sulfate 2 PUF DAILY  1844 AC 
 
  INH   09
 
Atorvastatin Calcium 40 MG 1700  1700 AC 
 
  PO   1652
 
Budesonide/ 2 PUF BID  2100 AC 
 
Formoterol Fumarate  INH   2007
 
Buspirone HCl 10 MG DAILY  0900 AC 
 
  PO   0929
 
Buspirone HCl 30 MG QPM  2100 AC 
 
  PO   
 
Calcium Carbonate 500 MG DAILY  1924 AC 
 
  PO   
 
Calcium Carbonate 0 .STK-MED ONE  1843 DC 
 
  PO   
 
Doxepin HCl 200 MG AT BEDTIME  2100 AC 
 
  PO   
 
Enoxaparin Sodium 40 MG DAILY  1758 AC 
 
  SC   2033
 
Escitalopram Oxalate 10 MG DAILY  1846 AC 
 
  PO   0928
 
Folic Acid 1 MG DAILY  0900 AC 
 
  PO   0930
 
Lorazepam 2 MG Q4  1800 CAN 
 
  IV   
 
Lorazepam 2 MG Q4H  1530 AC 
 
  PO   
 
Lorazepam 2 MG Q6  1800 DC 
 
  PO  0001  2326
 
Lorazepam 0 Q1P PRN  1800 AC 
 
  IV   1245
 
Losartan Potassium 50 MG ONCE ONE  1600 DC 
 
  PO  1601  1620
 
Losartan Potassium 50 MG DAILY  1849 AC 
 
  PO   0930
 
Multivitamins 1 TAB DAILY  0900 AC 
 
  PO   0932
 
Nicotine 14 MG DAILY  175 AC 
 
  TOP   
 
Nifedipine 30 MG DAILY  1846 AC 
 
  PO   0929
 
Ondansetron HCl 4 MG Q6P PRN  1930 DC 
 
  IV   2017
 
Ondansetron HCl 0 .STK-MED ONE  1844 DC 
 
  .ROUTE   
 
Ondansetron HCl 4 MG ONCE ONE  0815 DC 
 
  PO  0816  0910
 
Pantoprazole Sodium 40 MG DAILY  2037 AC 
 
  IV   0931
 
Polyethylene Glycol 17 GM DAILY  1632 AC 
 
  PO   1653
 
Potassium Chloride 40 MEQ ONCE ONE  0115 DC 
 
Dextrose/Sodium  1,000 ML IV  1114  0223
 
Chloride     
 
Senna/Docusate Sodium 2 TAB DAILY PRN  1645 AC 
 
  PO   
 
Sodium Chloride 1,000 ML ONCE ONE  1845 DC 
 
  IV  0804  0101
 
Thiamine HCl 100 MG DAILY  0900 AC 
 
  PO  0901  0932
 
Tramadol HCl 0 .STK-MED ONE  0409 DC 
 
  PO   
 
Trimethobenzamide HCl 200 MG TID PRN  1800 DC 
 
  IM   0332
 
 
 
 
Last 24 Hrs of Lab/Leroy Results
Last 24 Hrs of Labs/Mics:
 Laboratory Tests
 
18 0720:
Anion Gap 8, Estimated GFR > 60, BUN/Creatinine Ratio 6.7  L, Glucose 136  H, 
Calcium 8.9, Magnesium 1.9, Total Bilirubin 1.1, AST 43  H, ALT 50, Alkaline 
Phosphatase 115, Total Protein 6.4, Albumin 3.9, Globulin 2.5, Albumin/Globulin 
Ratio 1.6, CBC w Diff NO MAN DIFF REQ, RBC 4.11  L, MCV 95.1, MCH 32.6  H, MCHC 
34.3, RDW 15.5  H, MPV 8.3, Gran % 74.6, Lymphocytes % 16.9  L, Monocytes % 7.6,
Eosinophils % 0.6, Basophils % 0.3, Absolute Granulocytes 5.0, Absolute 
Lymphocytes 1.1  L, Absolute Monocytes 0.5, Absolute Eosinophils 0, Absolute 
Basophils 0
 
 
Assessment/Plan
Assessment:
he patient is 52-year-old female with above-mentioned past medical history.  She
came in evaluation of nausea vomiting and fever.  Her past medical history 
significant for alcohol abuse disorder and drinks about 4-5 points every day.  
On presentation her vitals were significant for tachycardia and blood pressure 
of 169/91.
Labs are significant for mild leukocytosis of 11.4 hypokalemia 3.1 transaminitis
AST 52 ALT 65
The patient is being admitted to general medicine floor for treatment and 
evaluation of following conditions
 
#Alcohol withdrawal
CIWA score- 0 since yesterday 
- c/o cold sweats and tremors
Significant history of alcohol abuse with hard liquor last drink day before 
yesterday morning.
-ciwa protocol 
-Ativan increased to  2 mg Q4
-symptomatic management of nausea with Zofran/Tigan
-Multivitamin folate and thiamine
- 
# Hypertension-
give extra dose of losartan 
 
#Transaminitis
In setting of alcohol abuse medically discrimination shows good prognosis
Continue to monitor
right upper quadrant ultrasound-steatosis
 
#Hypokalemia
 in setting of alcohol abuse and poor oral intake and ongoing nausea and 
vomiting
-Continue to monitor and replete aggressively
 
#Abdominal pain/N/V
Appears to be secondary to retching versus alcoholic gastritis.  
-We will start patient on Protonix
-lipase not elevated
-IV hydration with ongoing nausea vomiting
 
Full code/DVT prophylaxis with Lovenox/clears for now and advance as tolerated
Problem List:
 1. Alcohol intoxication
 
Pain Ratin
Pain Location:
epigastric
Pain Goal: Remain pain free
Pain Plan:
none
Tomorrow's Labs & Rationales:
isabelle Ortiz MD,Jacob 18 1117:
Attending MD Review Statement
 
Attending Statement
Attending MD Statement: examined this patient, discuss w/resident/PA/NP, agreed 
w/resident/PA/NP, reviewed EMR data (avail), discussed with nursing, discussed 
with case mgmt, amended to note
Attending Assessment/Plan:
Patient seen and examined.  Lying in bed appears agitated.  Mildly tremulous.  
Alert and oriented 3.  Reports that she is not feeling well.  Laboratory data 
shows that she is tachycardic.  Although CIWA scores look good this morning 
overnight she has scores as high as 17.  She is having clear objective signs of 
still withdrawing from alcohol use and should be treated more aggressively in 
order to prevent delirium tremens.  I recommend continuation of IV fluids and 
increasing her scheduled Ativan therapy to 2 mg orally every 4 hours.  Once she 
starts to show evidence of improvement this may be tapered down.
Transaminitis is likely secondary to alcohol use however she continues to 
complain of significant epigastric pain as well as nausea.  Recommend obtaining 
right upper quadrant sonogram to rule out other hepatobiliary disease.  Continue
patient on PPI therapy as well as antiemetic therapy.

## 2018-09-20 NOTE — ULTRASOUND REPORT
EXAMINATION:
US ABDOMEN LIMITED
 
CLINICAL INFORMATION:
Elevated transaminases. Right upper quadrant pain..
 
COMPARISON:
None
 
TECHNIQUE:
Real-time imaging of the right upper quadrant abdominal viscera.
 
FINDINGS:
 
PANCREAS: Normal.
 
LIVER: The liver is diffusely hyperechoic, consistent with steatosis. The
liver has normal, smooth contour. No focal hepatic lesion or intrahepatic
bile duct dilatation.
 
GALLBLADDER: Normal. The gallbladder is physiologically distended without
evidence of stones, sludge, polyps, wall thickening or pericholecystic fluid.
 
COMMON DUCT: The visualized common duct is normal in caliber, measuring up to
0.3 cm diameter. The distal CBD is obscured by bowel gas.
 
RIGHT KIDNEY: Normal. No hydronephrosis. No renal calculi or focal
parenchymal lesions. The kidney measures 11.7 cm in maximum dimension.
 
FREE FLUID: None.
 
IMPRESSION:
- Gallbladder is normal.
- Liver is diffusely hyperechoic, suggestive of steatosis.

## 2018-09-21 VITALS — SYSTOLIC BLOOD PRESSURE: 126 MMHG | DIASTOLIC BLOOD PRESSURE: 94 MMHG

## 2018-09-21 VITALS — SYSTOLIC BLOOD PRESSURE: 152 MMHG | DIASTOLIC BLOOD PRESSURE: 104 MMHG

## 2018-09-21 VITALS — SYSTOLIC BLOOD PRESSURE: 118 MMHG | DIASTOLIC BLOOD PRESSURE: 70 MMHG

## 2018-09-21 VITALS — SYSTOLIC BLOOD PRESSURE: 134 MMHG | DIASTOLIC BLOOD PRESSURE: 60 MMHG

## 2018-09-21 VITALS — DIASTOLIC BLOOD PRESSURE: 80 MMHG | SYSTOLIC BLOOD PRESSURE: 112 MMHG

## 2018-09-21 VITALS — SYSTOLIC BLOOD PRESSURE: 112 MMHG | DIASTOLIC BLOOD PRESSURE: 80 MMHG

## 2018-09-21 VITALS — SYSTOLIC BLOOD PRESSURE: 151 MMHG | DIASTOLIC BLOOD PRESSURE: 94 MMHG

## 2018-09-21 NOTE — PATIENT DISCHARGE INSTRUCTIONS
Discharge Instructions
 
General Discharge Information
You were seen/treated for:
ALcohol withdrawl 
Special Instructions:
Please follow up with PCP within 1 week of dischasrge
 
Please follow up with [facility]  [phone], on October 2 at 11 AM.
 
 
Acute Coronary Syndrome
 
Inclusion Criteria
At DC or during hospital stay patient has or had the following:
 
Discharge Core Measures
Meds if any: Prescribed or Continued at Discharge
Meds if any: NOT Prescribed or Continued at Discharge
 
Congestive Heart Failure
 
Inclusion Criteria
At DC or during hospital stay patient has or had the following:
 
Discharge Core Measures
Meds if any: Prescribed or Continued at Discharge
Meds if any: NOT Prescribed or Continued at Discharge
 
Cerebrovascular accident
 
Inclusion Criteria
At DC or during hospital stay patient has or had the following:
CVA/TIA Diagnosis No
 
Discharge Core Measures
Meds if any: Prescribed or Continued at Discharge
Meds if any: NOT Prescribed or Continued at Discharge
 
Venous thromboembolism
 
Discharge Core Measures
- Per Current guidelines, there needs to be overlap
- treatment for the first 5 days of Warfarin therapy.
- If discharged on Warfarin prior to 5 days of
- overlap therapy, the patient will need to be
- assessed for post discharge needs including
- *Post discharge parental anticoagulation
- *Warfarin and/or parental anticoagulation education
- *Follow up date to check INR post discharge
Meds if any: Prescribed or Continued at Discharge
Note: Overlap Therapy is Warfarin and Anticoagulant
Meds if any: NOT Prescribed or Continued at Discharge

## 2018-09-21 NOTE — PN- HOUSESTAFF
Regino Arango 18 0938:
Subjective
Follow-up For:
Alcohol withdrawal
Tachycardia high blood pressure
Subjective:
She was seen and examined this morning.  She complains of intense pain in her 
abdomen, epigastric area.  The pain is more due to nausea and retching and Tigan
gives her muscular pain.  The sublingual medication that she has been getting is
aggravating the nausea. 
 
Review of Systems
Constitutional:
Reports: see HPI. 
 
Objective
Last 24 Hrs of Vital Signs/I&O
 Vital Signs
 
 
Date Time Temp Pulse Resp B/P B/P Pulse O2 O2 Flow FiO2
 
     Mean Ox Delivery Rate 
 
 1004  120  134/60     
 
 0913  120  134/60     
 
 0700 98.6 120 16 134/60  93   
 
 1800 98.1 133 16 114/81     
 
 1741 99.1 130 20 114/81  96 Room Air  
 
 1620  119  182/124     
 
 1600 98.7 134 16 182/124     
 
 1417 99.0 130 20 172/130  95 Room Air  
 
 1233 98.9 119 18 182/124  98 Room Air  
 
 1224       Room Air Room Air 
 
 1200 97.5 119 16 182/124     
 
 
 Intake & Output
 
 
  1600  0800  0000
 
Intake Total  480 480
 
Output Total   
 
Balance  480 480
 
    
 
Intake, Oral  480 480
 
 
 
 
Physical Exam
General Appearance: Alert, Oriented X3, Cooperative, No Acute Distress
Cardiovascular: Regular Rate, No Murmurs
Lungs: Clear to Auscultation, Normal Air Movement
Abdomen: Normal Bowel Sounds (e), epigastric tenderness
Neurological: Normal Speech, Strength at 5/5 X4 Ext, Normal Tone, Sensation 
Intact
Extremities: No Clubbing, No Cyanosis, No Edema, Normal Pulses, No Tenderness/
Swelling
Current Medications:
 Current Medications
 
 
  Sig/Rafa Start time  Last
 
Medication Dose Route Stop Time Status Admin
 
Albuterol Sulfate 2 PUF DAILY  1844 AC 
 
  INH   09
 
Atorvastatin Calcium 40 MG 1700  1700 AC 
 
  PO   165
 
Budesonide/ 2 PUF BID 2100 AC 
 
Formoterol Fumarate  INH   09
 
Buspirone HCl 10 MG DAILY  0900 AC 
 
  PO   09
 
Buspirone HCl 30 MG QPM 2100 AC 
 
  PO   
 
Calcium Carbonate 500 MG DAILY 1924 AC 
 
  PO   0912
 
Calcium Carbonate 0 .STK-MED ONE  1843 DC 
 
  PO   
 
Doxepin HCl 200 MG AT BEDTIME  2100 AC 
 
  PO   
 
Enoxaparin Sodium 40 MG DAILY  1758 AC 
 
  SC   2033
 
Escitalopram Oxalate 10 MG DAILY  1846 AC 
 
  PO   0913
 
Folic Acid 1 MG DAILY  0900 AC 
 
  PO   0913
 
Lorazepam 2 MG Q4  1800 CAN 
 
  IV   
 
Lorazepam 2 MG Q4H  1530 AC 
 
  PO   0731
 
Lorazepam 0 Q1P PRN  1800 AC 
 
  IV   0923
 
Losartan Potassium 50 MG ONCE ONE  1600 DC 
 
  PO  1601  1620
 
Losartan Potassium 50 MG DAILY  1849 AC 
 
  PO   1004
 
Metoclopramide HCl 10 MG ONCE ONE  1015 DC 
 
  IV  1016  
 
Metoprolol Tartrate 12.5 MG BID  1006 AC 
 
  PO   
 
Multivitamins 1 TAB DAILY  0900 AC 
 
  PO   0913
 
Nicotine 14 MG DAILY  1759 AC 
 
  TOP   
 
Nifedipine 30 MG DAILY  1846 DC 
 
  PO   0913
 
Ondansetron HCl 4 MG ONCE ONE  0630 DC 
 
  IV  0631  0623
 
Ondansetron HCl 4 MG Q6P PRN  1930 DC 
 
  IV   2017
 
Ondansetron HCl 0 .STK-MED ONE  1844 DC 
 
  .ROUTE   
 
Pantoprazole Sodium 40 MG DAILY  2037 AC 
 
  IV   0914
 
Polyethylene Glycol 17 GM DAILY  1632 AC 
 
  PO   0911
 
Potassium Chloride 40 MEQ ONCE ONE  0115 DC 
 
Dextrose/Sodium  1,000 ML IV  1114  0223
 
Chloride     
 
Senna/Docusate Sodium 2 TAB DAILY PRN  1645 AC 
 
  PO   
 
Thiamine HCl 100 MG DAILY  0900 AC 
 
  PO  0901  0913
 
 
 
 
Assessment/Plan
Assessment:
he patient is 52-year-old female with above-mentioned past medical history.  She
came in evaluation of nausea vomiting and fever.  Her past medical history 
significant for alcohol abuse disorder and drinks about 4-5 points every day.  
On presentation her vitals were significant for tachycardia and blood pressure 
of 169/91.
Labs are significant for mild leukocytosis of 11.4 hypokalemia 3.1 transaminitis
AST 52 ALT 65
The patient is being admitted to general medicine floor for treatment and 
evaluation of following conditions
 
#Alcohol withdrawal
CIWA score- 6-8 since yesterday 
- c/o cold sweats and tremors
Last drink was 2018 am
-ciwa protocol 
-Ativan   2 mg Q4
-QTC prolongation with Zofran, Tigan will be given for nausea
-Multivitamin folate and thiamine
 
# Hypertension-
Controlled with losartan
Patient has a high heart rate following blood pressure control
Cardiology consult appreciated
We will discontinue nifedipine and give metoprolol until cardiology 
recommendations are in.
 
#Transaminitis
right upper quadrant ultrasound-steatosis
 
#Hypokalemia
-Continue to monitor and replete aggressively
 
#Abdominal pain/N/V
-Appears to be secondary to retching versus alcoholic gastritis.  
- patient on Protonix
-lipase not elevated
-IV hydration with ongoing nausea vomiting
 
Full code/DVT prophylaxis with Lovenox/clears for now and advance as tolerated
Problem List:
 1. Alcohol withdrawal
 
 2. Epigastric pain
 
 3. Hypertension
 
Pain Ratin
Pain Location:
epigastric
Pain Goal: Remain pain free
Pain Plan:
tigan
Tomorrow's Labs & Rationales:
bep, lft
 
 
Diana GOULD,Joãoyuliamarlene 18 1020:
Attending MD Review Statement
 
Attending Statement
Attending MD Statement: examined this patient, discuss w/resident/PA/NP, agreed 
w/resident/PA/NP, reviewed EMR data (avail), discussed with nursing, discussed 
with case mgmt, amended to note
Attending Assessment/Plan:
Patient seen and examined.  CIWA scores are improving.  Blood pressure is also 
improving.  She did receive an extra dose of losartan.  This morning she is 
lying comfortably in bed not in any acute distress.  She was sleeping well we 
initially entered the room.  Patient however is noted to remain in sinus 
tachycardia with heart rate as high as 133.  Even when she is calm and relaxed 
she is still tachycardic.
On examination she is not agitated.  She is not tachycardic.  EKG shows sinus 
tachycardia with no ischemic changes. 
She reports seeing cardiologist Dr. Martin as an outpatient for heart related 
issues.  It is unclear why she was seen him.
 
Recommendations:
-From an alcohol withdrawal standpoint she is doing better.  Taper down 
benzodiazepine therapy.
-She continues complain of nausea.  Provide antiemetic therapy with 
metoclopramide.
-For tachycardia recommend discontinue amlodipine and starting patient on 
metoprolol 25 mg twice daily.  Continue losartan in addition for blood pressure 
control.
-Obtain cardiology consultation with Dr. Martin for further information on her 
cardiac history and evaluation of her sinus tachycardia per

## 2018-09-22 VITALS — SYSTOLIC BLOOD PRESSURE: 126 MMHG | DIASTOLIC BLOOD PRESSURE: 90 MMHG

## 2018-09-22 VITALS — DIASTOLIC BLOOD PRESSURE: 82 MMHG | SYSTOLIC BLOOD PRESSURE: 130 MMHG

## 2018-09-22 VITALS — SYSTOLIC BLOOD PRESSURE: 110 MMHG | DIASTOLIC BLOOD PRESSURE: 72 MMHG

## 2018-09-22 VITALS — SYSTOLIC BLOOD PRESSURE: 131 MMHG | DIASTOLIC BLOOD PRESSURE: 88 MMHG

## 2018-09-22 VITALS — DIASTOLIC BLOOD PRESSURE: 72 MMHG | SYSTOLIC BLOOD PRESSURE: 111 MMHG

## 2018-09-22 VITALS — DIASTOLIC BLOOD PRESSURE: 76 MMHG | SYSTOLIC BLOOD PRESSURE: 98 MMHG

## 2018-09-22 VITALS — DIASTOLIC BLOOD PRESSURE: 92 MMHG | SYSTOLIC BLOOD PRESSURE: 135 MMHG

## 2018-09-22 VITALS — SYSTOLIC BLOOD PRESSURE: 108 MMHG | DIASTOLIC BLOOD PRESSURE: 80 MMHG

## 2018-09-22 VITALS — SYSTOLIC BLOOD PRESSURE: 120 MMHG | DIASTOLIC BLOOD PRESSURE: 88 MMHG

## 2018-09-22 VITALS — DIASTOLIC BLOOD PRESSURE: 96 MMHG | SYSTOLIC BLOOD PRESSURE: 112 MMHG

## 2018-09-22 VITALS — SYSTOLIC BLOOD PRESSURE: 108 MMHG | DIASTOLIC BLOOD PRESSURE: 89 MMHG

## 2018-09-22 VITALS — DIASTOLIC BLOOD PRESSURE: 86 MMHG | SYSTOLIC BLOOD PRESSURE: 110 MMHG

## 2018-09-22 VITALS — SYSTOLIC BLOOD PRESSURE: 128 MMHG | DIASTOLIC BLOOD PRESSURE: 95 MMHG

## 2018-09-22 VITALS — SYSTOLIC BLOOD PRESSURE: 135 MMHG | DIASTOLIC BLOOD PRESSURE: 92 MMHG

## 2018-09-22 NOTE — CONS- GASTROENTEROLOGY
General Information and HPI
 
Consulting Request
Date of Consult: 09/22/18
Requested By:
Diana GOULD,Jacob
 
Reason for Consult:
Abdominal pain, vomiting, dysphagia.
Source of Information: patient
Exam Limitations: no limitations
History of Present Illness:
Ms. Ferreira is a 52-year-old female who presented to [facility] a few 
days ago for abdominal pain, nausea and vomiting and was admitted for alcohol 
detox.  She notes that she takes Nexium at home for heartburn which for the most
part controls her symptoms, but for the past few weeks she has been having 
worsening symptoms and she has also been having some bilious nausea and 
vomiting.  She also notes having some scant amounts of blood in her vomitus, but
she does not vomit up any clots.  She notes her bowel movements at home are 
normal and she is without any constipation, rectal bleeding, or melena.  She 
admits to drinking a significant amount of liquor on a daily basis reporting 
about a half a bottle of [employer] a day.  Since admission she has been 
getting lorazepam for withdrawal symptoms and she is also been started on IV 
Protonix and oral sucralfate, but she continues to note difficulty tolerating 
liquids noting that it is giving her upper abdominal pain and she also notes a 
globus sensation.
 
Allergies/Medications
Allergies:
Coded Allergies:
NO KNOWN ALLERGIES (06/05/17)
 
Home Med List:
Albuterol Sulfate (Proair Hfa) 90 MCG HFA.AER.AD   2 PUF INH PRN RESPIRATORY  (
Reported)
Budesonide/Formoterol Fumarate (Symbicort 160-4.5 Mcg Inhaler) 160 MCG-4.5 MCG/
ACTUATION HFA.AER.AD   2 PUF INH BID RESPIRATORY  (Reported)
Buspirone HCl 30 MG TABLET   1 TAB PO QPM MENTAL HEALTH  (Reported)
Buspirone HCl 10 MG TABLET   1 TAB PO DAILY MENTAL HEALTH  (Reported)
Doxepin HCl 100 MG CAPSULE   2 CAP PO DAILY Mental Health  (Reported)
Escitalopram Oxalate 10 MG TABLET   1 TAB PO DAILY MENTAL HEALTH  (Reported)
Metoprolol Tartrate 25 MG TABLET   1 TAB PO BID HTN
Multiple Vitamin (Multivitamins) 1 EACH TABLET   1 TAB PO DAILY supplement
Nifedipine (Nifedipine ER) 30 MG TABLET.ER   1 TAB PO DAILY HEART  (Reported)
Omeprazole 40 MG CAPSULE.DR   1 CAP PO DAILY gastritis
Pravastatin Sodium 40 MG TABLET   1 TAB PO DAILY CHOLESTEROL  (Reported)
Valsartan (Diovan) 160 MG TABLET   1 TAB PO DAILY HEART  (Reported)
 
Current Medications:
 Current Medications
 
 
  Sig/Rafa Start time  Last
 
Medication Dose Route Stop Time Status Admin
 
Albuterol Sulfate 2 PUF DAILY 09/19 1844 AC 09/21
 
  INH   0913
 
Atorvastatin Calcium 40 MG 1700 09/20 1700 AC 09/21
 
  PO   1610
 
Budesonide/ 2 PUF BID 09/19 2100 AC 09/22
 
Formoterol Fumarate  INH   0925
 
Buspirone HCl 10 MG DAILY 09/20 0900 AC 09/22
 
  PO   0909
 
Buspirone HCl 30 MG QPM 09/19 2100 AC 09/21
 
  PO   2032
 
Calcium Carbonate 0 .STK-MED ONE 09/22 0907 DC 
 
  PO   
 
Calcium Carbonate 500 MG DAILY 09/20 1924 AC 09/22
 
  PO   0915
 
Doxepin HCl 200 MG AT BEDTIME 09/19 2100 AC 09/21
 
  PO   2033
 
Enoxaparin Sodium 40 MG DAILY 09/19 1758 AC 09/19
 
  SC   2033
 
Escitalopram Oxalate 10 MG DAILY 09/19 1846 AC 09/22
 
  PO   0910
 
Folic Acid 1 MG DAILY 09/20 0900 AC 09/22
 
  PO   0910
 
Lorazepam 2 MG Q6 09/21 1800 AC 09/22
 
  PO   1243
 
Lorazepam 2 MG Q4H 09/20 1530 DC 09/21
 
  PO   1424
 
Lorazepam 0 Q1P PRN 09/19 1800 AC 09/22
 
  IV   0923
 
Losartan Potassium 50 MG DAILY 09/19 1849 AC 09/22
 
  PO   0910
 
Metoprolol Tartrate 25 MG BID 09/21 2100 AC 09/22
 
  PO   0911
 
Metoprolol Tartrate 12.5 MG BID 09/21 1006 DC 09/21
 
  PO   1129
 
Multivitamins 1 TAB DAILY 09/20 0900 AC 09/22
 
  PO   0925
 
Nicotine 14 MG DAILY 09/19 1759 AC 
 
  TOP   
 
Pantoprazole Sodium 40 MG DAILY 09/19 2037 AC 09/22
 
  IV   0915
 
Patient Medication  1 ED ONE ONE 09/21 1845 DC 
 
Teaching  ED 09/21 1846  
 
Polyethylene Glycol 17 GM DAILY 09/20 1632 AC 09/22
 
  PO   0915
 
Senna/Docusate Sodium 2 TAB DAILY PRN 09/20 1645 AC 
 
  PO   
 
Sucralfate 1 GM 4 TIMES/DAY 09/21 1700 AC 09/22
 
  PO   1243
 
Thiamine HCl 100 MG DAILY 09/20 0900 DC 09/22
 
  PO 09/22 0901  0916
 
 
 
 
Past History
 
Travel History
Traveled to [place] past 21 day No
 
Medical History
Blood Transfusion Hx: No
Neurological: NONE
EENT: NONE
Cardiovascular: hypertension
Respiratory: asthma
Gastrointestinal: NONE
Hepatic: NONE
Renal: NONE
Musculoskeletal: NONE
Psychiatric: NONE
Endocrine: NONE
Blood Disorders: NONE
Cancer(s): NONE
GYN/Reproductive: NONE
 
Surgical History
Surgical History: non-contributory
 
Family History
Relations & Conditions If Any:
Relation not specified for:
  *No pertinent family history
 
 
Psychosocial History
Where Do You Live? Home
Who Do You Live With? spouse
Smoking Status: Current Everyday Smoker
ETOH Use: heavy use
 
Functional Ability
ADLs
Independent: dressing, eating, toileting, bathing. 
Ambulation: independent
IADLs
Independent: shopping, housework, finances, food prep, telephone, transportation
, medication admin. 
 
Review of Systems
 
Review of Systems
Constitutional:
Denies: chills, diaphoresis, fever, weakness. 
EENTM:
Denies: no symptoms. 
Cardiovascular:
Reports: chest pain, palpitations.  Denies: edema, orthopena. 
Respiratory:
Denies: no symptoms. 
GI:
Reports: see HPI. 
Genitourinary:
Denies: no symptoms. 
Musculoskeletal:
Denies: no symptoms. 
Skin:
Denies: no symptoms. 
Neurological/Psychological:
Reports: tremors.  Denies: tonic-clonic seizures. 
Hematologic/Endocrine:
Denies: no symptoms. 
Immunologic/Allergic:
Denies: no symptoms. 
All Other Systems: Reviewed and Negative
 
Exam & Diagnostic Data
Vital Signs and I&O
Vital Signs
 
 
Date Time Temp Pulse Resp B/P B/P Pulse O2 O2 Flow FiO2
 
     Mean Ox Delivery Rate 
 
09/22 1030 98.1 98 18 111/72  95 Room Air  
 
09/22 1000 98.6 98 20 120/88     
 
09/22 0911    128/95     
 
09/22 0800 98.1 99 20 110/72     
 
09/22 0651 98.4 93 20 128/95  92 Room Air  
 
09/22 0400 98.5 100 18 112/96     
 
09/22 0208 98.6 101 18 135/92  97 Room Air  
 
09/22 0200 98.6 101 18 135/92     
 
09/22 0024 97.9 109 20 108/89  96 Room Air  
 
09/22 0000 97.9 109 18 108/80     
 
09/21 2250  116 20 112/80     
 
09/21 2200  116  112/80     
 
09/21 2043 98.5 123 20 152/104  95 Room Air  
 
09/21 2033  123  152/104     
 
09/21 2000 98.5 123 20 152/104     
 
09/21 1620 98.1 105 18 151/94  97 Room Air  
 
09/21 1424 98.0 110 20 118/70  97 Room Air  
 
 
 Intake & Output
 
 
 09/22 1600 09/22 0400 09/21 1600 09/21 0400 09/20 1600 09/20 0400
 
Intake Total 120 543  365
 
Output Total      
 
Balance 120 543  365
 
       
 
Intake, IV  43   1100 125
 
Intake, Oral 120 500 930 480 270 240
 
Number     0 
 
Bowel      
 
Movements      
 
Patient   173 lb   173 lb
 
Weight      
 
Weight      Bed scale
 
Measurement      
 
Method      
 
 
 
 
Physical Exam
General Appearance: well developed/nourished, no apparent distress, anxious
Head: atraumatic, normal appearance
Eyes:
Bilateral: normal appearance. 
Ears, Nose, Throat: normal pharynx, normal ENT inspection, hearing grossly 
normal
Neck: normal inspection, supple, full range of motion
Respiratory: normal breath sounds, chest non-tender, no respiratory distress
Cardiovascular: regular rate/rhythm
Gastrointestinal: normal bowel sounds, soft, tenderness
Back: normal inspection, normal range of motion
Extremities: normal inspection, normal range of motion, no edema
Neurologic/Psych: no motor/sensory deficits, awake, alert, oriented x 3
Skin: intact, normal color, warm/dry
 
Results
Pertinent Lab Results:
 Laboratory Tests
 
 
 09/20 0720
 
Chemistry 
 
  Sodium (137 - 145 mmol/L) 138
 
  Potassium (3.5 - 5.1 mmol/L) 3.5
 
  Chloride (98 - 107 mmol/L) 105
 
  Carbon Dioxide (22 - 30 mmol/L) 25
 
  Anion Gap (5 - 16) 8
 
  BUN (7 - 17 mg/dL) 4  L
 
  Creatinine (0.5 - 1.0 mg/dL) 0.6
 
  Estimated GFR (>60 ml/min) > 60
 
  BUN/Creatinine Ratio (7 - 25 %) 6.7  L
 
  Glucose (65 - 99 mg/dL) 136  H
 
  Calcium (8.4 - 10.2 mg/dL) 8.9
 
  Magnesium (1.6 - 2.3 mg/dL) 1.9
 
  Total Bilirubin (0.2 - 1.3 mg/dL) 1.1
 
  AST (14 - 36 U/L) 43  H
 
  ALT (9 - 52 U/L) 50
 
  Alkaline Phosphatase (<127 U/L) 115
 
  Total Protein (6.3 - 8.2 g/dL) 6.4
 
  Albumin (3.5 - 5.0 g/dL) 3.9
 
  Globulin (1.9 - 4.2 gm/dL) 2.5
 
  Albumin/Globulin Ratio (1.1 - 2.2 %) 1.6
 
  TSH (0.270 - 4.200 uIU/mL) 2.310
 
  Free T4 (0.64 - 1.79 ng/dL) 1.33
 
Hematology 
 
  CBC w Diff NO MAN DIFF REQ
 
  WBC (4.8 - 10.8 /CUMM) 6.7
 
  RBC (4.20 - 5.40 /CUMM) 4.11  L
 
  Hgb (12.0 - 16.0 G/DL) 13.4
 
  Hct (37 - 47 %) 39.1
 
  MCV (81.0 - 99.0 FL) 95.1
 
  MCH (27.0 - 31.0 PG) 32.6  H
 
  MCHC (33.0 - 37.0 G/DL) 34.3
 
  RDW (11.5 - 14.5 %) 15.5  H
 
  Plt Count (130 - 400 /CUMM) 202
 
  MPV (7.4 - 10.4 FL) 8.3
 
  Gran % (42.2 - 75.2 %) 74.6
 
  Lymphocytes % (20.5 - 51.1 %) 16.9  L
 
  Monocytes % (1.7 - 9.3 %) 7.6
 
  Eosinophils % (0 - 5 %) 0.6
 
  Basophils % (0.0 - 2.0 %) 0.3
 
  Absolute Granulocytes (1.4 - 6.5 /CUMM) 5.0
 
  Absolute Lymphocytes (1.2 - 3.4 /CUMM) 1.1  L
 
  Absolute Monocytes (0.10 - 0.60 /CUMM) 0.5
 
  Absolute Eosinophils (0.0 - 0.7 /CUMM) 0
 
  Absolute Basophils (0.0 - 0.2 /CUMM) 0
 
 
 
Imaging/Other Studies:
SERVICE DATE: 09/20/18-
EXAM TYPE: US - US-LIMITED ABDOMEN
 
EXAMINATION:
US ABDOMEN LIMITED
 
CLINICAL INFORMATION:
Elevated transaminases. Right upper quadrant pain..
 
COMPARISON:
None
 
TECHNIQUE:
Real-time imaging of the right upper quadrant abdominal viscera.
 
FINDINGS:
 
PANCREAS: Normal.
 
LIVER: The liver is diffusely hyperechoic, consistent with steatosis. The
liver has normal, smooth contour. No focal hepatic lesion or intrahepatic
bile duct dilatation.
 
GALLBLADDER: Normal. The gallbladder is physiologically distended without
evidence of stones, sludge, polyps, wall thickening or pericholecystic fluid.
 
COMMON DUCT: The visualized common duct is normal in caliber, measuring up to
0.3 cm diameter. The distal CBD is obscured by bowel gas.
 
RIGHT KIDNEY: Normal. No hydronephrosis. No renal calculi or focal
parenchymal lesions. The kidney measures 11.7 cm in maximum dimension.
 
FREE FLUID: None.
 
IMPRESSION:
- Gallbladder is normal.
- Liver is diffusely hyperechoic, suggestive of steatosis.
 
 
Assessment/Plan
Assessment/Recommendations:
Assessment: Ms. Cortez is a 52-year-old female with a history of alcohol abuse 
currently admitted for alcohol withdrawal symptoms was also been having nausea 
and vomiting and upper abdominal pain which I feel is likely secondary to 
underlying reflux disease and exacerbated by alcohol withdrawal.  She has had 
unremarkable blood work and she is also had an unremarkable ultrasound that was 
negative for gallstones.  In spite being on IV Protonix and they'll also 
Carafate she has continued to have symptoms so if her diet is not able to be 
advanced consideration will be given for a diagnostic upper endoscopy this 
coming Monday.  If her symptoms do improve this can likely be pursued as an 
outpatient as can a screening colonoscopy.
 
Recommendations:
1.  Continue benzodiazepines as needed for withdrawal symptoms.
2.  Continue IV Protonix and oral ulcer grossly.
3.  Administer IV antiemetics as needed.
4.  Advance diet as tolerated and if she is unable to advance her diet would 
then give consideration to performing a diagnostic upper endoscopy on Monday.  
Otherwise this can be pursued as an outpatient.
 
I will continue to follow this patient and make further recommendations based on
her clinical course.
 
 
Consult Acknowledgment
- Thank you for your consult request.

## 2018-09-22 NOTE — DISCHARGE SUMMARY
Hospital Course
Allergies:
Coded Allergies:
NO KNOWN ALLERGIES (06/05/17)
 
 
Discharge Instructions
 
Medications at Discharge
Discharge Medications:
Stop taking the following medications:
Nifedipine (Nifedipine ER) 30 MG TABLET.ER ORAL DAILY Qty = 30
 
Continue taking these medications:
Escitalopram Oxalate (Escitalopram Oxalate) 10 MG TABLET
    1 Tablet ORAL DAILY
 
Buspirone HCl (Buspirone HCl) 30 MG TABLET
    1 Tablet ORAL Every night
    Qty = 30
 
Buspirone HCl (Buspirone HCl) 10 MG TABLET
    1 Tablet ORAL DAILY
    Qty = 60
 
Budesonide/Formoterol Fumarate (Symbicort 160-4.5 Mcg Inhaler) 160 MCG-4.5 MCG/
ACTUATION HFA.AER.AD
    2 Puff Inhale through mouth TWICE DAILY
    Qty = 10
 
Albuterol Sulfate (Proair Hfa) 90 MCG HFA.AER.AD
    2 Puff Inhale through mouth  as needed for RESPIRATORY
    Qty = 9
 
Doxepin HCl (Doxepin HCl) 100 MG CAPSULE
    2 Capsule ORAL DAILY
    Qty = 60
 
Valsartan (Diovan) 160 MG TABLET
    1 Tablet ORAL DAILY
    Qty = 30
 
Pravastatin Sodium (Pravastatin Sodium) 40 MG TABLET
    1 Tablet ORAL DAILY
    Qty = 30
 
Start taking the following new medications:
Metoprolol Tartrate (Metoprolol Tartrate) 25 MG TABLET
    1 Tablet ORAL TWICE DAILY
    Qty = 30
    No Refills
 
Multiple Vitamin (Multivitamins) 1 EACH TABLET
    1 Tablet ORAL DAILY
    Qty = 30
    No Refills
 
Omeprazole (Omeprazole) 40 MG CAPSULE.DR
    1 Capsule ORAL DAILY
    Qty = 30
    No Refills
 
 
 
Attending MD Review Statement
Documenting Attending:
Jacob Ortiz MD
Other Findings:
Discharged in stable condition.

## 2018-09-22 NOTE — PN- HOUSESTAFF
Subjective
Follow-up For:
EtOH withdrawal
Subjective:
Patient seen and examined.  She is seen lying on her side in bed resting 
comfortably.  She appears to be in no acute distress.  She reports that she is 
hungry and would like to eat "some chicken soup" and is requesting to have her 
diet advanced.  She does admit that she has some epigastric discomfort that 
feels like heartburn.  She otherwise denies any bloody vomiting, coughing up 
blood, fever, chills, chest pain, shortness of breath, or abdominal pain.  She 
denies any diarrhea, nausea, bright red blood per rectum.
 
Review of Systems
Constitutional:
Reports: see HPI. 
 
Objective
Last 24 Hrs of Vital Signs/I&O
 Vital Signs
 
 
Date Time Temp Pulse Resp B/P B/P Pulse O2 O2 Flow FiO2
 
     Mean Ox Delivery Rate 
 
2054 98.0 98 18 126/90     
 
2052 98.0 98 18 126/90     
 
 1730 98.2 95 18 130/82     
 
 1600 98.1 102 16 131/88     
 
 1510 98.3 94 18 110/86  95 Room Air  
 
 1030 98.1 98 18 111/72  95 Room Air  
 
 1000 98.6 98 20 120/88     
 
 0911    128/95     
 
 0800 98.1 99 20 110/72     
 
 0651 98.4 93 20 128/95  92 Room Air  
 
 0400 98.5 100 18 112/96     
 
 0208 98.6 101 18 135/92  97 Room Air  
 
 0200 98.6 101 18 135/92     
 
 0024 97.9 109 20 108/89  96 Room Air  
 
 0000 97.9 109 18 108/80     
 
 2250  116 20 112/80     
 
 2200  116  112/80     
 
 
 Intake & Output
 
 
  1600  0800  0000
 
Intake Total 750 120 543
 
Output Total   
 
Balance 750 120 543
 
    
 
Intake, IV   43
 
Intake, Oral 750 120 500
 
 
 
 
Physical Exam
General Appearance: Alert, Oriented X3, Cooperative, No Acute Distress
Other Physical Findings:
General - well developed, well nourished middle aged  woman in no acute
distress
HEENT - NCAT, PERRL, EOMI, anicteric sclera
Neck- Supple, no JVD/HJR, no bruits, trachea midline
Cardio - S1, S2 w/o murmurs/gallops/rubs; regular rate and rhythm
Resp - Clear to auscultation bilaterally
GI - Soft, nontender, nondistended, bowel sounds present
Neuro - Awake and alert, CN II - XII grossly intact
Extremities - No edema, pulses intact
Current Medications:
 Current Medications
 
 
  Sig/Rafa Start time  Last
 
Medication Dose Route Stop Time Status Admin
 
Albuterol Sulfate 2 PUF DAILY  1844 AC 
 
  INH   0913
 
Atorvastatin Calcium 40 MG 1700  1700 AC 
 
  PO   1616
 
Budesonide/ 2 PUF BID  2100 AC 
 
Formoterol Fumarate  INH   2049
 
Buspirone HCl 10 MG DAILY  0900 AC 
 
  PO   09
 
Buspirone HCl 30 MG QPM  2100 AC 
 
  PO   204
 
Calcium Carbonate 0 .STK-MED ONE  0907 DC 
 
  PO   
 
Calcium Carbonate 500 MG DAILY  1924 AC 
 
  PO   0915
 
Doxepin HCl 200 MG AT BEDTIME  2100 AC 
 
  PO   204
 
Enoxaparin Sodium 40 MG DAILY  1758 AC 
 
  SC   2033
 
Escitalopram Oxalate 10 MG DAILY  1846 AC 
 
  PO   0910
 
Folic Acid 1 MG DAILY  0900 AC 
 
  PO   0910
 
Lorazepam 1 MG Q6  1800 AC 
 
  PO   1819
 
Lorazepam 2 MG Q6  1800 DC 
 
  PO   1243
 
Lorazepam 0 Q1P PRN  1800 AC 
 
  IV   2105
 
Losartan Potassium 50 MG DAILY  184 AC 
 
  PO   0910
 
Metoprolol Tartrate 25 MG BID  2100 AC 
 
  PO   205
 
Multivitamins 1 TAB DAILY  0900 AC 
 
  PO   0925
 
Nicotine 14 MG DAILY  175 AC 
 
  TOP   
 
Pantoprazole Sodium 40 MG DAILY 2037 AC 
 
  IV   0915
 
Polyethylene Glycol 17 GM DAILY  1632 AC 
 
  PO   0915
 
Senna/Docusate Sodium 2 TAB DAILY PRN  1645 AC 
 
  PO   
 
Sucralfate 1 GM 4 TIMES/DAY  1700 AC 
 
  PO   2048
 
Thiamine HCl 100 MG DAILY  0900 DC 
 
  PO  0901  0916
 
 
 
 
Assessment/Plan
Assessment:
52-year-old woman with multiple medical problems seen for evaluation of nausea, 
vomiting, and fever with epigastric discomfort admitted for EtOH detox and 
alcoholic gastritis.
 
Patient states that she has persistent epigastric discomfort but would like to 
eat.  Vital signs remain within normal limits.  Physical examination is 
unremarkable.  CIWA scores remain elevated in the past 24 hours requiring 
multiple doses of Ativan.  Labs today were not checked.
 
Ativan is tapered down to 1 mg every 6 hours with additional doses continued per
CIWA.  Patient was seen by gastroenterology today whom recommended advancing her
diet and consideration of an upper endoscopy on Monday.
 
Problem list
-EtOH withdrawal
-epigastric discomfort with reported recent bloody emesis, likely alcoholic 
gastritis
-Hypokalemia
-Transaminitis
-Tachycardia
-Anxiety
-Hypertension
 
Plan
-Continue general medicine floor admission
-CIWA scoring
-Ativan 1 mg p.o. every 6 hours
-Ativan PRN per CIWA
-Protonix 40 mg IV daily
-Carafate 1 g p.o. 4 times daily
-Calcium carbonate 500 mg p.o. daily
-Losartan 50 mg p.o. daily
-Thiamine/folate/multivitamin
-Nicotine 14 mg patch
-Continue home meds: Lexapro and BuSpar, metoprolol tartrate, atorvastatin, 
doxepin, Symbicort, Ventolin
-GI following for epigastric discomfort
-Pain control as needed
-Regular diet
-DVT prophylaxis with Lovenox
-Full code
-Possible upper endoscopy on Monday
Problem List:
 1. Epigastric pain
 
 2. Alcohol intoxication
 
Pain Ratin
Pain Location:
Epigastric
Pain Goal: Remain pain free
Pain Plan:
See assessment
Tomorrow's Labs & Rationales:
None

## 2018-09-22 NOTE — PN- ATT ADDEND
Attending Addendum
Attending Brief Note
Patient seen and examined.  Apparently she continues to be agitated and unable 
reported to be tremulous according to nursing staff.  She was scored as high as 
17 on CIWA last night.  No symptoms given her points for tremulousness, 
diaphoresis and agitation.  She was also reported on occasion to be disoriented.
 She received a total of 2 mg of IV Ativan yesterday and at that noon dose of 
Ativan.  She has received about 1.5 mg of Ativan so far today intravenously.
 
On examination she was sleeping calmly when I enter the room.  She responded 
appropriately.  She is obviously very anxious.  She is asking to ensure that she
was anxiolytic regimen including her home dose of Lexapro and BuSpar which she 
gets from her primary care provider.  She reports that she has not been referred
to a psychiatrist or psychologist in the past.
 
She continues to complain of epigastric discomfort worsened with meals.  She 
reports it as a reflex sensation.  She reports no significant improvement on PPI
therapy with Protonix and the addition of sucralfate yesterday.
 
Vital Signs
 
 
Date Time Temp Pulse Resp B/P B/P Pulse O2 O2 Flow FiO2
 
     Mean Ox Delivery Rate 
 
09/22 0651 98.4 93 20 128/95  92 Room Air  
 
09/22 0400 98.5 100 18 112/96     
 
09/22 0208 98.6 101 18 135/92  97 Room Air  
 
09/22 0200 98.6 101 18 135/92     
 
09/22 0024 97.9 109 20 108/89  96 Room Air  
 
09/22 0000 97.9 109 18 108/80     
 
09/21 2250  116 20 112/80     
 
09/21 2200  116  112/80     
 
09/21 2043 98.5 123 20 152/104  95 Room Air  
 
09/21 2033  123  152/104     
 
09/21 2000 98.5 123 20 152/104     
 
09/21 1620 98.1 105 18 151/94  97 Room Air  
 
09/21 1424 98.0 110 20 118/70  97 Room Air  
 
09/21 1311 98.5 108 18 126/94  96 Room Air  
 
09/21 1129  120  134/60     
 
09/21 1004  120  134/60     
 
09/21 0913  120  134/60     
 
 
General appearance: Well-developed and not in any acute distress.
HEENT: Anicteric, no pallor, pupils equal and reactive.
Neck: Supple with no jugular venous distention.
Heart: S1-S2 regular with no audible murmur.
Lungs: Adequate and symmetric air entry bilaterally with no added sounds.
Abdomen: Nondistended with normal bowel sounds.  Soft, nontender with no 
palpable masses.
Extremities: No pedal edema.  No cyanosis.
Skin: Intact
 
Problems:
1.  Alcohol withdrawal syndrome
2.  Alcoholic steatosis
3.  Anxiety disorder; on Lexapro and BuSpar
4.  Hypertension
5.  Persistent epigastric discomfort.
6.  Tachycardia
 
Plan:
-Continue Ativan taper.  Decrease dose to 1 mg orally every 6 hours today.
-Continue her Lexapro and BuSpar.
-Her epigastric pain is likely due to alcoholic gastritis.  Continue Protonix 
and sucralfate.  However due to ongoing pain not responding to therapy recommend
gastroenterology evaluation.
-Tachycardia appears to be improving with addition of metoprolol to her regimen.
 Continue this.  Continue losartan as well for blood pressure control.

## 2018-09-23 VITALS — DIASTOLIC BLOOD PRESSURE: 60 MMHG | SYSTOLIC BLOOD PRESSURE: 90 MMHG

## 2018-09-23 VITALS — SYSTOLIC BLOOD PRESSURE: 109 MMHG | DIASTOLIC BLOOD PRESSURE: 58 MMHG

## 2018-09-23 VITALS — DIASTOLIC BLOOD PRESSURE: 60 MMHG | SYSTOLIC BLOOD PRESSURE: 118 MMHG

## 2018-09-23 VITALS — DIASTOLIC BLOOD PRESSURE: 70 MMHG | SYSTOLIC BLOOD PRESSURE: 102 MMHG

## 2018-09-23 NOTE — PN- HOUSESTAFF
Regino Arango 18 0737:
Subjective
Follow-up For:
ALcohol withdrawl
Subjective:
Patient was seen and examined today. SHe continues to have trouble swallowing 
andheart burn despite use of sucralfate. SHe is anxious and is not able to 
tolerate diet. Denies fatigue, tremors, palpitation, cvhest pain, hematemsis
 
Review of Systems
Constitutional:
Reports: see HPI. 
 
Objective
Last 24 Hrs of Vital Signs/I&O
 Vital Signs
 
 
Date Time Temp Pulse Resp B/P B/P Pulse O2 O2 Flow FiO2
 
     Mean Ox Delivery Rate 
 
 0648 98.1 97 18 90/60  94 Room Air  
 
 0200  82 16      
 
 2200 98.4 102 20 98/76  96 Room Air  
 
 2054 98.0 98 18 126/90     
 
 2052 98.0 98 18 126/90     
 
 1730 98.2 95 18 130/82     
 
 1600 98.1 102 16 131/88     
 
 1510 98.3 94 18 110/86  95 Room Air  
 
 1030 98.1 98 18 111/72  95 Room Air  
 
 1000 98.6 98 20 120/88     
 
 0911    128/95     
 
 0800 98.1 99 20 110/72     
 
 
 Intake & Output
 
 
  0800  0000  1600
 
Intake Total  120 750
 
Output Total   
 
Balance  120 750
 
    
 
Intake, Oral  120 750
 
Number  2 
 
Bowel   
 
Movements   
 
 
 
 
Physical Exam
General Appearance: Alert, Oriented X3, Cooperative, No Acute Distress
Cardiovascular: Regular Rate, No Murmurs
Lungs: Clear to Auscultation, Normal Air Movement
Abdomen: Normal Bowel Sounds, Soft, No Tenderness, No Hepatospenomegaly, No 
Masses
Neurological: Normal Speech, Strength at 5/5 X4 Ext, Normal Tone, Sensation 
Intact
Extremities: No Clubbing, No Cyanosis, No Edema, Normal Pulses, No Tenderness/
Swelling
Current Medications:
 Current Medications
 
 
  Sig/Rafa Start time  Last
 
Medication Dose Route Stop Time Status Admin
 
Albuterol Sulfate 2 PUF DAILY  1844 AC 
 
  INH   0913
 
Atorvastatin Calcium 40 MG 1700  1700 AC 
 
  PO   1616
 
Budesonide/ 2 PUF BID  2100 AC 
 
Formoterol Fumarate  INH   0939
 
Buspirone HCl 10 MG DAILY  0900 AC 
 
  PO   0938
 
Buspirone HCl 30 MG QPM 2100 AC 
 
  PO   2048
 
Calcium Carbonate 500 MG DAILY  1924 AC 
 
  PO   0939
 
Doxepin HCl 200 MG AT BEDTIME 2100 AC 
 
  PO   2048
 
Enoxaparin Sodium 40 MG DAILY  1758 AC 
 
  SC   2033
 
Escitalopram Oxalate 10 MG DAILY  1846 AC 
 
  PO   0938
 
Folic Acid 1 MG DAILY  09 AC 
 
  PO   0938
 
Lorazepam 1 MG Q6  1800 AC 
 
  PO   1215
 
Lorazepam 0 Q1P PRN  1800 DC 
 
  IV   0642
 
Losartan Potassium 50 MG DAILY  184 AC 
 
  PO   38
 
Metoprolol Tartrate 25 MG BID  2100 AC 
 
  PO   38
 
Multivitamins 1 TAB DAILY  09 AC 
 
  PO   0938
 
Nicotine 14 MG DAILY  1759 AC 
 
  TOP   
 
Pantoprazole Sodium 40 MG DAILY 2037 AC 
 
  IV   0940
 
Polyethylene Glycol 17 GM DAILY  1632 AC 
 
  PO   0915
 
Senna/Docusate Sodium 2 TAB DAILY PRN  1645 AC 
 
  PO   
 
Sucralfate 1 GM 4 TIMES/DAY  1700 AC 
 
  PO   1215
 
 
 
 
Assessment/Plan
Assessment:
The patient is 52-year-old female with above-mentioned past medical history.  
She came in evaluation of nausea vomiting and fever.  Her past medical history 
significant for alcohol abuse disorder and drinks about 4-5 points every day.  
On presentation her vitals were significant for tachycardia and blood pressure 
of 169/91.
Labs are significant for mild leukocytosis of 11.4 hypokalemia 3.1 transaminitis
AST 52 ALT 65
The patient is being admitted to general medicine floor for treatment and 
evaluation of following conditions
 
#Alcohol withdrawal
CIWA score- since yesterday 
 
Last drink was 2018 am
-will stop ciwa protocol 
-Ativan   1 mg Q6, continue taper if CIWA score is improvong, patients symptoms 
are more related to Anxiety.
-QTC prolongation with Zofran, Tigan will be given for nausea
-Multivitamin folate and thiamine
 
#Abdominal pain/N/V
-Appears to be secondary to retching versus alcoholic gastritis.  
-patient on Protonix
-lipase not elevated
-IV hydration with ongoing nausea vomiting
- continue sucralfate
-ENdoscopy tomorrow, patient did not improve medically, cvannot tolerate diet. 
and hence made NPO for tonight
 
# Hypertension-
Controlled with losartan
Patient has a high heart rate following blood pressure control
Cardiology consult appreciated
continue metoprolol until cardiology recommendations are in.
 
#Transaminitis
right upper quadrant ultrasound-steatosis
 
#Hypokalemia
-Continue to monitor and replete aggressively
 
 
Full code/DVT prophylaxis with Lovenox/clears for now and advance as tolerated
Problem List:
 1. Alcohol withdrawal
 
 2. Epigastric pain
 
Pain Ratin
Pain Location:
nione
Pain Goal: Remain pain free
Pain Plan:
as discussed
Tomorrow's Labs & Rationales:
none
 
 
Diana GOULD,Jacob 18 1009:
Attending MD Review Statement
 
Attending Statement
Attending MD Statement: examined this patient, discuss w/resident/PA/NP, agreed 
w/resident/PA/NP, reviewed EMR data (avail), discussed with nursing, amended to 
note
Attending Assessment/Plan:
Patient seen and examined.  Lying in bed.  Appears comfortable.  Patient however
has been scoring high on her CIWA scores.  Today is day 5 of admission.  She has
been receiving as needed Ativan in addition to scheduled dose of Ativan.  She 
continues to receive scores as high as 16.  She however is calm.  Alert and 
oriented 3.  She is not agitated or tremulous at present.  However nursing 
staff reports that she will get very anxious and requests benzodiazepine 
therapy.  She then receives a [employer] on CIWA and receives IV Ativan.  She 
does have history of underlying anxiety disorder.
She remains concerned about epigastric discomfort stating that she has had no 
improvement since hospitalization.  She has been seen by the GI service with 
plans for an EGD tomorrow if symptoms persist.
 
Problems:
1.  Alcohol withdrawal syndrome
2.  Anxiety disorder
3.  Sinus tachycardia
4.  Hypertension
5.  Hepatic steatosis
 
Recommendations:
-I would recommend continue patient only on oral Ativan scheduled dose.  I would
keep on his current dose a day and not taper it.  However I would recommend 
evaluation by the psychiatry service tomorrow for assistance in managing her 
anxiety disorder in order to avoid developing benzodiazepine dependence.
-Keep n.p.o. past midnight tomorrow for EGD.
-Repeat CBCs in a.m. to ensure that hemoglobin level is stable.
-Her sinus tachycardia appears to be adequately controlled with metoprolol.  She
is also continued on her losartan for blood pressure control.  Amlodipine which 
she was taking at home has been discontinued.  Ddition to

## 2018-09-24 VITALS — DIASTOLIC BLOOD PRESSURE: 63 MMHG | SYSTOLIC BLOOD PRESSURE: 106 MMHG

## 2018-09-24 VITALS — SYSTOLIC BLOOD PRESSURE: 110 MMHG | DIASTOLIC BLOOD PRESSURE: 60 MMHG

## 2018-09-24 VITALS — SYSTOLIC BLOOD PRESSURE: 110 MMHG | DIASTOLIC BLOOD PRESSURE: 71 MMHG

## 2018-09-24 VITALS — SYSTOLIC BLOOD PRESSURE: 104 MMHG | DIASTOLIC BLOOD PRESSURE: 51 MMHG

## 2018-09-24 VITALS — SYSTOLIC BLOOD PRESSURE: 123 MMHG | DIASTOLIC BLOOD PRESSURE: 66 MMHG

## 2018-09-24 LAB
ABSOLUTE GRANULOCYTE CT: 2.4 /CUMM (ref 1.4–6.5)
BASOPHILS # BLD: 0 /CUMM (ref 0–0.2)
BASOPHILS NFR BLD: 0.2 % (ref 0–2)
EOSINOPHIL # BLD: 0.2 /CUMM (ref 0–0.7)
EOSINOPHIL NFR BLD: 3.8 % (ref 0–5)
ERYTHROCYTE [DISTWIDTH] IN BLOOD BY AUTOMATED COUNT: 15.9 % (ref 11.5–14.5)
GRANULOCYTES NFR BLD: 53.9 % (ref 42.2–75.2)
HCT VFR BLD CALC: 38.3 % (ref 37–47)
LYMPHOCYTES # BLD: 1.3 /CUMM (ref 1.2–3.4)
MCH RBC QN AUTO: 32.8 PG (ref 27–31)
MCHC RBC AUTO-ENTMCNC: 34.6 G/DL (ref 33–37)
MCV RBC AUTO: 95.1 FL (ref 81–99)
MONOCYTES # BLD: 0.6 /CUMM (ref 0.1–0.6)
PLATELET # BLD: 218 /CUMM (ref 130–400)
PMV BLD AUTO: 8.4 FL (ref 7.4–10.4)
PROTHROMBIN TIME: 11.9 SEC (ref 9.4–12.5)
RED BLOOD CELL CT: 4.03 /CUMM (ref 4.2–5.4)
WBC # BLD AUTO: 4.5 /CUMM (ref 4.8–10.8)

## 2018-09-24 PROCEDURE — 0DB68ZX EXCISION OF STOMACH, VIA NATURAL OR ARTIFICIAL OPENING ENDOSCOPIC, DIAGNOSTIC: ICD-10-PCS | Performed by: INTERNAL MEDICINE

## 2018-09-24 PROCEDURE — 0DB58ZX EXCISION OF ESOPHAGUS, VIA NATURAL OR ARTIFICIAL OPENING ENDOSCOPIC, DIAGNOSTIC: ICD-10-PCS | Performed by: INTERNAL MEDICINE

## 2018-09-24 PROCEDURE — 0DB98ZX EXCISION OF DUODENUM, VIA NATURAL OR ARTIFICIAL OPENING ENDOSCOPIC, DIAGNOSTIC: ICD-10-PCS | Performed by: INTERNAL MEDICINE

## 2018-09-24 NOTE — PN- HOUSESTAFF
Regino Arango 18 1141:
Subjective
Follow-up For:
alcohol withdrawl
epigastric pain
Subjective:
Patient was seen and examined this morning. She was lying in her bwed and c/o of
epigastric pain. SHe was not able to tolerate any food intake., Denies any 
symptoms of withdrawl. 
 
Review of Systems
Constitutional:
Reports: see HPI. 
 
Objective
Last 24 Hrs of Vital Signs/I&O
 Vital Signs
 
 
Date Time Temp Pulse Resp B/P B/P Pulse O2 O2 Flow FiO2
 
     Mean Ox Delivery Rate 
 
 1600 97.8 70 20 110/60  96 Room Air  
 
 1126 97.6 77 18 110/71  94 Room Air  
 
 1002  75  106/63     
 
 1001  75  106/63     
 
 0606 98.3 75 20 106/63  93 Room Air  
 
 0200 98.0 89 20 104/51  97 Room Air  
 
 2207 98.7 100 20 102/70  95 Room Air  
 
 2126  92  100/64     
 
 1752 98.3 90 19 118/60  98 Room Air  
 
 
 Intake & Output
 
 
  1600  0800  0000
 
Intake Total 150  
 
Output Total   
 
Balance 150  
 
    
 
Intake,   
 
Intake, Oral 0  
 
Number 0  
 
Bowel   
 
Movements   
 
 
 
 
Physical Exam
General Appearance: Alert, Oriented X3, Cooperative, No Acute Distress
Cardiovascular: Regular Rate, No Murmurs
Lungs: Clear to Auscultation, Normal Air Movement
Abdomen: Normal Bowel Sounds, Soft, No Tenderness, No Hepatospenomegaly, No 
Masses
Neurological: Normal Speech, Strength at 5/5 X4 Ext, Normal Tone, Sensation 
Intact
Extremities: No Clubbing, No Cyanosis, No Edema, Normal Pulses, No Tenderness/
Swelling
Current Medications:
 Current Medications
 
 
  Sig/Rafa Start time  Last
 
Medication Dose Route Stop Time Status Admin
 
Albuterol Sulfate 2 PUF DAILY  1844 AC 
 
  INH   1011
 
Atorvastatin Calcium 40 MG 1700  1700 AC 
 
  PO   1808
 
Budesonide/ 2 PUF BID  2100 AC 
 
Formoterol Fumarate  INH   1009
 
Buspirone HCl 20 MG TID  1400 AC 
 
  PO   
 
Buspirone HCl 10 MG DAILY  0900 DC 
 
  PO   1012
 
Buspirone HCl 30 MG QPM 2100 DC 
 
  PO   2126
 
Calcium Carbonate 500 MG DAILY  192 AC 
 
  PO   1008
 
Chlorhexidine  0 .STK-MED ONE  1527 DC 
 
Gluconate  TOP   
 
Doxepin HCl 200 MG AT BEDTIME  2100 AC 
 
  PO   2126
 
Enoxaparin Sodium 40 MG DAILY  175 AC 
 
  SC   1009
 
Escitalopram Oxalate 10 MG DAILY  1846 AC 
 
  PO   1002
 
Folic Acid 1 MG DAILY  09 AC 
 
  PO   1002
 
Lorazepam 1 MG Q12  2100 AC 
 
  PO   
 
Lorazepam 0.5 MG AT BEDTIME PRN  1915 DC 
 
  PO  0600  1947
 
Lorazepam 1 MG Q6  1800 DC 
 
  PO   0633
 
Losartan Potassium 50 MG DAILY  184 AC 
 
  PO   1002
 
Metoprolol Tartrate 25 MG BID  2100 AC 
 
  PO   1001
 
Multivitamins 1 TAB DAILY  09 AC 
 
  PO   1001
 
Nicotine 14 MG DAILY  175 AC 
 
  TOP   
 
Omeprazole 40 MG DAILY AC  0700 AC 
 
  PO   
 
Pantoprazole Sodium 40 MG DAILY  2037 DC 
 
  IV   1004
 
Patient Medication  1 ED ONE ONE  1045 DC 
 
Teaching  ED  1046  
 
Polyethylene Glycol 17 GM DAILY  1632 AC 
 
  PO   0915
 
Senna/Docusate Sodium 2 TAB DAILY PRN  1645 AC 
 
  PO   
 
Sucralfate 1 GM 4 TIMES/DAY  1700 AC 
 
  PO   1013
 
 
 
 
Last 24 Hrs of Lab/Leroy Results
Last 24 Hrs of Labs/Mics:
 Laboratory Tests
 
18 1009:
PT 11.9, INR 1.09
 
18 0755:
CBC w Diff NO MAN DIFF REQ, RBC 4.03  L, MCV 95.1, MCH 32.8  H, MCHC 34.6, RDW 
15.9  H, MPV 8.4, Gran % 53.9, Lymphocytes % 27.8, Monocytes % 14.3  H, 
Eosinophils % 3.8, Basophils % 0.2, Absolute Granulocytes 2.4, Absolute 
Lymphocytes 1.3, Absolute Monocytes 0.6, Absolute Eosinophils 0.2, Absolute 
Basophils 0
 Microbiology
 1345  GI: KOH Preparation - COMP
                YEAST
 
 
 
Assessment/Plan
Assessment:
The patient is 52-year-old female with above-mentioned past medical history.  
She came in evaluation of nausea vomiting and fever.  Her past medical history 
significant for alcohol abuse disorder and drinks about 4-5 points every day.  
On presentation her vitals were significant for tachycardia and blood pressure 
of 169/91.
Labs are significant for mild leukocytosis of 11.4 hypokalemia 3.1 transaminitis
AST 52 ALT 65
The patient is being admitted to general medicine floor for treatment and 
evaluation of following conditions
 
#Alcohol withdrawal
Last drink was 2018 am
-will stop ciwa protocol 
-Ativan   1 mg Q6, continue taper if CIWA score is improvong, patients symptoms 
are more related to Anxiety.
-QTC prolongation with Zofran, Tigan will be given for nausea
-Multivitamin folate and thiamine
 
#anxiety-
Patient has a baseline anxiety that was interferiong with her CIWA scoring
psychiatry consulted and recommendatiuons to be followed.
 
#Abdominal pain/N/V
-Appears to be secondary to retching versus alcoholic gastritis.  
-patient on Protonix, change to PO
-lipase not elevated
-IV hydration with ongoing nausea vomiting
- continue sucralfate
-ENdoscopy done, f/u endoscopy notes
- advance diet
 
# Hypertension-
Controlled with losartan
Patient has a high heart rate following blood pressure control
Cardiology consult appreciated
continue metoprolol until cardiology recommendations are in.
 
#Transaminitis
right upper quadrant ultrasound-steatosis
 
 
 
Full code/DVT prophylaxis with Lovenox/clears for now and advance as tolerated
Problem List:
 1. Alcohol withdrawal
 
 2. Epigastric pain
 
Pain Ratin
Pain Location:
none
Pain Goal: Remain pain free
Pain Plan:
as discusssed
Tomorrow's Labs & Rationales:
none
 
 
Jose Jaimes :
Attending MD Review Statement
 
Attending Statement
Attending MD Statement: examined this patient, discuss w/resident/PA/NP, agreed 
w/resident/PA/NP, reviewed EMR data (avail), discussed with nursing, discussed 
with case mgmt, amended to note
Attending Assessment/Plan:
The patient was seen and discussed with house staff. Psychiatry input 
appreciated. Ativan had not been tapered over weekend due to high CIWA scores, 
however these scores reflect underlying anxiety disorder rather than true 
withdrawal. Will taper Ativan and increase buspirone as per psychiatry. The 
patient agrees to IOP as OP. Will taper Ativan further today to bid.

## 2018-09-24 NOTE — CONS- PSYCHIATRY
Psychiatric Consult
Date of Consult: 09/24/18
Reason for Consult:
"anxiety"
History of Present Illness:
This 52-year-old female presented to the emergency room complaining of 
withdrawal symptoms from alcohol.  She reported nausea and vomiting following 24
hours of sobriety.
 
The patient describes her main problem as "drinking".  She has been drinking 2-3
pints of Southern comfort a day for the past 6 months.  Her motivation for 
seeking treatment was that her 13-year-old grandchild asked her to.  The patient
reports that she has been experiencing withdrawal symptoms including nausea, 
retching and vomiting.  She has been blacking out.  She has never had a seizure 
and has no history of DTs.  The patient has a history of 2 DUIs the last of 
which was within the past 2 years.  She is on modified probation until March 2018.  Alcohol is affecting her marriage.
 
The patient describes her mood as "lonely, MD, use this, disappointing" for 
about the past year.  She sleeps well with doxepin which again she has been 
taking for about a year.  Appetite and energy are poor, concentration is normal.
 She is not suicidal and there are no psychotic symptoms.  She describes herself
as feeling "anxious" when she is not drinking.  By this she means "jittery, racy
".
 
The patient is also complaining of abdominal pain and is having an OGD today.
 
Past psychiatric history: The patient has never seen a psychiatrist or 
therapist.  She is on antidepressant medication from her primary care doctor.  
She takes Lexapro and buspirone and takes doxepin to sleep.  She was previously 
prescribed quetiapine but stopped it secondary to weight gain.  She says she 
takes her medications regularly, even when she is drinking.
 
Williams has no symptoms consistent with a diagnosis of bipolar disorder.  
She has no history of deliberate self-harm or suicide attempts.  She has never 
been admitted to hospital psychiatrically.
 
Substance abuse history: The patient started drinking alcohol when she was 10 
"my mother got me drunk on my birthday".  She says alcohol became a problem from
the outset.  She has had several extended periods of sobriety 1 for 10 and 1 for
15 years.  She was sober for most of her children's upbringing.  She relapsed 6 
months ago and says that prior to that she was sober for 10 years.  Marijuana in
her 20s.  No other substance use.
 
Family psychiatric history: Denies any history of mental health problems or 
substance abuse in her family.
 
Personal history: The patient has been  for 36 years.  She says the 
marriage is strained because of her drinking.  Initially she says the marriage 
is a good one however she later says that her  has taken the keys to her 
car and that he is not available for her to talk to as he is "isolated".  She 
has 3 children with whom she has a good relationship.  She has 3 grandchildren. 
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 
for her last DUI.  She has attended AA meetings but has not connected with AA.  
She has some good supports in the community.
Allergies:
Coded Allergies:
NO KNOWN ALLERGIES (06/05/17)
 
Current Medications:
 Med
 
 
Albuterol Sulfate 2 PUF INH DAILY 09/19/18 1844
 
Atorvastatin Calcium 40 MG PO 1700 09/20/18 1700
 
Budesonide/Formoterol Fumarate 2 PUF INH BID 09/19/18 2100
 
Buspirone HCl 10 MG PO DAILY 09/20/18 0900
 
Buspirone HCl 30 MG PO QPM 09/19/18 2100
 
Calcium Carbonate 500 MG PO DAILY 09/20/18 1924
 
Doxepin HCl 200 MG PO AT BEDTIME 09/19/18 2100
 
Enoxaparin Sodium 40 MG SC DAILY 09/19/18 1758
 
Escitalopram Oxalate 10 MG PO DAILY 09/19/18 1846
 
Folic Acid 1 MG PO DAILY 09/20/18 0900
 
Lorazepam 1 MG PO Q12 09/24/18 2100
 
Losartan Potassium 50 MG PO DAILY 09/19/18 1849
 
Metoprolol Tartrate 25 MG PO BID 09/21/18 2100
 
Multivitamins 1 TAB PO DAILY 09/20/18 0900
 
Nicotine 14 MG TOP DAILY 09/19/18 1759
 
Pantoprazole Sodium 40 MG IV DAILY 09/19/18 2037
 
Polyethylene Glycol 17 GM PO DAILY 09/20/18 1632
 
Senna/Docusate Sodium 2 TAB PO DAILY PRN 09/20/18 1645
 
Sucralfate 1 GM PO 4 TIMES/DAY 09/21/18 1700
 
 
 
Past History
 
Past Medical History
Neurological: NONE
EENT: NONE
Cardiovascular: hypertension
Respiratory: asthma
Gastrointestinal: NONE
Hepatic: NONE
Renal: NONE
Musculoskeletal: NONE
Psychiatric: NONE
Endocrine: NONE
Blood Disorders: NONE
Cancer(s): NONE
GYN/Reproductive: NONE
 
Past Surgical History
Surgical History: non-contributory
 
Psychosocial History
Strengths/Capabilities:
Pt is seeking help
 Pt has a history of being sober years ago
Physical Limitations (Interventions):
none observed
 
Psychiatric Treatment History
Diagnosis:
depression
 ETOH abuse
Risk Factors: substance abuse, poor impulse control, limited support
 
Substance Abuse Treatment
Comments:
See HPI for substance abuse and psychiatric history.
 
Assessment/Plan
 
Mental Status
Orientation: Person, Place, Situation
Mental Status Exam:
The patient is a well-groomed 52-year-old female in [facility].  She was 
alert and oriented 3.  She was not tremulous or diaphoretic.  Gait was steady. 
Eye contact was good.  Speech was normal in rate, rhythm, volume and tone.  She 
described her mood as depressed and anxious.  Her affect was mood congruent but 
with good range.  She was not suicidal or homicidal.  Thought process was normal
in tempo, stream and form with no delusions or obsessions.  Attention and 
concentration were good.  There is no perceptual abnormality.  Impulse control 
is good.  Intelligence level is average, fund of knowledge average, use of 
language appropriate.  Recent and remote memory are intact.  Patient's insight 
is good.  Her judgment is unimpaired.
Diffential Diagnosis:
Major depressive disorder recurrent moderate with anxious distress
Impression:
The patient reports symptoms of depression and anxiety in the context of alcohol
withdrawal.  She reports adequate medication adherence despite drinking 2-3 
pints of Southern comfort a day.  She has never seen a psychiatrist and is 
treated by her primary care doctor.  She finds buspirone very effective and is 
open to having this increased.  She is also agreeable to attending an intensive 
outpatient program.  She is motivated for sobriety.
Provisional Treatment Plan:
 -Continue detox as ordered.  The patient is tolerating it well with normal 
vital signs.
 -The patient takes buspirone 10 mg in the morning and 30 mg at night.  She 
finds it very effective for anxiety.  Will increase dose to 20 mg 3 times daily.
 -The patient is agreeable to do so diagnosis intensive outpatient program.  An 
appointment has been made for her at [facility] on October 2 at 11 AM.
 
Psychiatry will sign off for now.  Thank you for consulting us on this patient.

## 2018-09-24 NOTE — PROC NOTE ENDOSCOPY
Endoscopy Procedure
Medical History: unchanged (see meditech consult)
Mental Status: alert/oriented
Heart/Lung Eval Prior to Sedation: within normal limits
Candidate for Sedation? Yes
Procedure Date: 09/24/18
Procedure Type: EGD w/biopsy
:
Harsh Ely MD
ASA Classification: II
Indications:
Nausea, vomiting, heartburn.
Instrument: diagnostic gastroscope
Meds Received: MAC
Patient's Tolerance: good
Complications: none
Extent Reached: second part of duodenum
Procedure:
After getting written informed consent the patient was placed in the left 
lateral decubitus position with pulse oximetry, cardiac monitoring, and 
supplemental oxygen given.  A bite block was inserted and IV sedation was given 
until the desired effect was achieved.  A high definition upper Olympus 
endoscope was then inserted into the mouth and advanced to the second portion of
the duodenum with little difficulty.  Retroflexed views and photodocumentation 
was obtained.
 
Findings:
Esophagus: There were whitish plaques throughout the esophagus which easily 
brushed away with the upper endoscope revealing normal underlying esophageal 
mucosa and this most likely represented residue from the sucralfate suspension 
she is taking.  Brushings were obtained from the whitish plaques with a cytology
brush and were sent for KOH preparation.  The Z line was located at 35 cm and 
was moderately erythematous also with an overlying exudate, but no ulcers or 
erosions were appreciated.  Biopsies were obtained from the Z line with cold 
biopsy forceps and were sent to pathology for further evaluation.  There were no
esophageal varices appreciated.  The hiatal narrowing was at approximately 35 cm
from the incisors accounting for a small 2 cm sliding hiatal hernia.
 
Stomach: The gastric mucosa was grossly normal in appearance.  There were no 
ulcers, erosions, or masses appreciated.  There was some leftover bile and quit 
residue which also likely represented the sucralfate she is taking.  Distention 
was normal, and while peristalsis was appreciated it was somewhat decreased.  
Random biopsies were obtained from the antrum body of the stomach with cold 
biopsy forceps and were sent to pathology for further evaluation.
 
Duodenum: The duodenal bulb was minimally erythematous, but there were no ulcers
or erosions appreciated.  The duodenal sweep and folds are grossly normal 
appearance.  Random biopsies were obtained from the second portion of the 
duodenum and were sent to pathology for further evaluation.
 
Impression:
1.  Whitish plaques in the esophagus not consistent with Candida esophagitis and
likely represented residue from the sucralfate suspension she is taking status 
post brushings for KOH  preparation.
2.  Nonerosive reflux esophagitis status post biopsies.
3.  Small 2 cm hiatal hernia.
3.  Suggestion of gastroparesis status post gastric biopsies.
4.  Nonerosive duodenitis.
 
Recommendations:
1.  She will be transferred back to medical floor and her diet should be 
advanced as tolerated.
2.  Would change her IV protonix to PO.
3.  Continue sucralfate as needed.
4.  Follow up pathology as an outpatient and if the KOH preparation comes back 
strongly positive would then treat with a 7-10 day course of diflucan.
5.  She should follow an anti-reflux regimen.
6.  Use anti-emetics as needed.
7.  She should avoid nsaids.

## 2018-09-25 VITALS — SYSTOLIC BLOOD PRESSURE: 104 MMHG | DIASTOLIC BLOOD PRESSURE: 54 MMHG

## 2018-09-25 NOTE — PN- HOUSESTAFF
Jose Jaimes MD 09/25/18 2139:
Attending MD Review Statement
 
Attending Statement
Attending MD Statement: examined this patient, discuss w/resident/PA/NP, agreed 
w/resident/PA/NP, reviewed EMR data (avail), discussed with nursing, discussed 
with case mgmt, amended to note
Attending Assessment/Plan:
The patient was seen and discussed with house staff. In good spirits today and 
much less anxious. OK to discharge today on increased dose of Buspar as per 
psychiatry. The patient agrees to follow-up with IOP.